# Patient Record
Sex: FEMALE | Race: WHITE | NOT HISPANIC OR LATINO
[De-identification: names, ages, dates, MRNs, and addresses within clinical notes are randomized per-mention and may not be internally consistent; named-entity substitution may affect disease eponyms.]

---

## 2019-05-08 ENCOUNTER — RESULT REVIEW (OUTPATIENT)
Age: 34
End: 2019-05-08

## 2020-04-22 ENCOUNTER — RESULT REVIEW (OUTPATIENT)
Age: 35
End: 2020-04-22

## 2020-05-26 ENCOUNTER — APPOINTMENT (OUTPATIENT)
Dept: ANTEPARTUM | Facility: CLINIC | Age: 35
End: 2020-05-26
Payer: COMMERCIAL

## 2020-05-26 ENCOUNTER — ASOB RESULT (OUTPATIENT)
Age: 35
End: 2020-05-26

## 2020-05-26 PROCEDURE — 76801 OB US < 14 WKS SINGLE FETUS: CPT

## 2020-05-26 PROCEDURE — 76813 OB US NUCHAL MEAS 1 GEST: CPT | Mod: 59

## 2020-05-26 PROCEDURE — 36416 COLLJ CAPILLARY BLOOD SPEC: CPT

## 2020-05-28 ENCOUNTER — APPOINTMENT (OUTPATIENT)
Dept: ANTEPARTUM | Facility: CLINIC | Age: 35
End: 2020-05-28

## 2020-07-01 ENCOUNTER — APPOINTMENT (OUTPATIENT)
Dept: PEDIATRIC MEDICAL GENETICS | Facility: CLINIC | Age: 35
End: 2020-07-01
Payer: COMMERCIAL

## 2020-07-01 DIAGNOSIS — Z78.9 OTHER SPECIFIED HEALTH STATUS: ICD-10-CM

## 2020-07-01 DIAGNOSIS — E03.9 HYPOTHYROIDISM, UNSPECIFIED: ICD-10-CM

## 2020-07-01 DIAGNOSIS — O09.522 SUPERVISION OF ELDERLY MULTIGRAVIDA, SECOND TRIMESTER: ICD-10-CM

## 2020-07-01 PROCEDURE — 99443: CPT

## 2020-07-01 PROCEDURE — ZZZZZ: CPT

## 2020-07-21 ENCOUNTER — APPOINTMENT (OUTPATIENT)
Dept: ANTEPARTUM | Facility: CLINIC | Age: 35
End: 2020-07-21
Payer: COMMERCIAL

## 2020-07-21 ENCOUNTER — ASOB RESULT (OUTPATIENT)
Age: 35
End: 2020-07-21

## 2020-07-21 PROCEDURE — 76811 OB US DETAILED SNGL FETUS: CPT

## 2020-11-01 RX ADMIN — Medication 600 MILLIGRAM(S): at 12:30

## 2020-11-09 ENCOUNTER — ASOB RESULT (OUTPATIENT)
Age: 35
End: 2020-11-09

## 2020-11-09 ENCOUNTER — OUTPATIENT (OUTPATIENT)
Dept: OUTPATIENT SERVICES | Facility: HOSPITAL | Age: 35
LOS: 1 days | End: 2020-11-09

## 2020-11-09 ENCOUNTER — APPOINTMENT (OUTPATIENT)
Dept: ANTEPARTUM | Facility: CLINIC | Age: 35
End: 2020-11-09
Payer: COMMERCIAL

## 2020-11-09 PROCEDURE — 99214 OFFICE O/P EST MOD 30 MIN: CPT | Mod: 25

## 2020-11-09 PROCEDURE — 99072 ADDL SUPL MATRL&STAF TM PHE: CPT

## 2020-11-09 PROCEDURE — 76816 OB US FOLLOW-UP PER FETUS: CPT

## 2020-11-09 PROCEDURE — 76818 FETAL BIOPHYS PROFILE W/NST: CPT | Mod: 26

## 2020-11-11 ENCOUNTER — ASOB RESULT (OUTPATIENT)
Age: 35
End: 2020-11-11

## 2020-11-11 ENCOUNTER — APPOINTMENT (OUTPATIENT)
Dept: MATERNAL FETAL MEDICINE | Facility: CLINIC | Age: 35
End: 2020-11-11
Payer: COMMERCIAL

## 2020-11-11 DIAGNOSIS — O99.810 ABNORMAL GLUCOSE COMPLICATING PREGNANCY: ICD-10-CM

## 2020-11-11 PROCEDURE — G0109 DIAB MANAGE TRN IND/GROUP: CPT | Mod: 95

## 2020-11-11 RX ORDER — BLOOD-GLUCOSE METER
KIT MISCELLANEOUS 4 TIMES DAILY
Qty: 2 | Refills: 2 | Status: ACTIVE | COMMUNITY
Start: 2020-11-11 | End: 1900-01-01

## 2020-11-11 RX ORDER — ISOPROPYL ALCOHOL 0.7 ML/ML
SWAB TOPICAL
Qty: 2 | Refills: 2 | Status: ACTIVE | COMMUNITY
Start: 2020-11-11 | End: 1900-01-01

## 2020-11-11 RX ORDER — LANCETS 33 GAUGE
EACH MISCELLANEOUS
Qty: 2 | Refills: 2 | Status: ACTIVE | COMMUNITY
Start: 2020-11-11 | End: 1900-01-01

## 2020-11-11 RX ORDER — BLOOD-GLUCOSE METER
W/DEVICE KIT MISCELLANEOUS
Qty: 1 | Refills: 0 | Status: ACTIVE | COMMUNITY
Start: 2020-11-11 | End: 1900-01-01

## 2020-11-14 ENCOUNTER — TRANSCRIPTION ENCOUNTER (OUTPATIENT)
Age: 35
End: 2020-11-14

## 2020-11-15 ENCOUNTER — INPATIENT (INPATIENT)
Facility: HOSPITAL | Age: 35
LOS: 3 days | Discharge: HOME | End: 2020-11-19
Attending: SPECIALIST | Admitting: SPECIALIST

## 2020-11-15 VITALS — TEMPERATURE: 99 F

## 2020-11-15 DIAGNOSIS — O26.899 OTHER SPECIFIED PREGNANCY RELATED CONDITIONS, UNSPECIFIED TRIMESTER: ICD-10-CM

## 2020-11-15 DIAGNOSIS — Z3A.00 WEEKS OF GESTATION OF PREGNANCY NOT SPECIFIED: ICD-10-CM

## 2020-11-15 DIAGNOSIS — Z98.891 HISTORY OF UTERINE SCAR FROM PREVIOUS SURGERY: Chronic | ICD-10-CM

## 2020-11-15 LAB
BASOPHILS # BLD AUTO: 0.06 K/UL — SIGNIFICANT CHANGE UP (ref 0–0.2)
BASOPHILS NFR BLD AUTO: 0.6 % — SIGNIFICANT CHANGE UP (ref 0–2)
BLD GP AB SCN SERPL QL: NEGATIVE — SIGNIFICANT CHANGE UP
EOSINOPHIL # BLD AUTO: 0.08 K/UL — SIGNIFICANT CHANGE UP (ref 0–0.5)
EOSINOPHIL NFR BLD AUTO: 0.8 % — SIGNIFICANT CHANGE UP (ref 0–6)
GLUCOSE BLDC GLUCOMTR-MCNC: 87 MG/DL — SIGNIFICANT CHANGE UP (ref 70–99)
HCT VFR BLD CALC: 37.6 % — SIGNIFICANT CHANGE UP (ref 34.5–45)
HGB BLD-MCNC: 12.3 G/DL — SIGNIFICANT CHANGE UP (ref 11.5–15.5)
IMM GRANULOCYTES NFR BLD AUTO: 1.9 % — HIGH (ref 0–1.5)
LYMPHOCYTES # BLD AUTO: 19.3 % — SIGNIFICANT CHANGE UP (ref 13–44)
LYMPHOCYTES # BLD AUTO: 2.01 K/UL — SIGNIFICANT CHANGE UP (ref 1–3.3)
MCHC RBC-ENTMCNC: 29.6 PG — SIGNIFICANT CHANGE UP (ref 27–34)
MCHC RBC-ENTMCNC: 32.7 % — SIGNIFICANT CHANGE UP (ref 32–36)
MCV RBC AUTO: 90.4 FL — SIGNIFICANT CHANGE UP (ref 80–100)
MONOCYTES # BLD AUTO: 0.98 K/UL — HIGH (ref 0–0.9)
MONOCYTES NFR BLD AUTO: 9.4 % — SIGNIFICANT CHANGE UP (ref 2–14)
NEUTROPHILS # BLD AUTO: 7.07 K/UL — SIGNIFICANT CHANGE UP (ref 1.8–7.4)
NEUTROPHILS NFR BLD AUTO: 68 % — SIGNIFICANT CHANGE UP (ref 43–77)
NRBC # FLD: 0 K/UL — SIGNIFICANT CHANGE UP (ref 0–0)
PLATELET # BLD AUTO: 218 K/UL — SIGNIFICANT CHANGE UP (ref 150–400)
PMV BLD: 11.4 FL — SIGNIFICANT CHANGE UP (ref 7–13)
RBC # BLD: 4.16 M/UL — SIGNIFICANT CHANGE UP (ref 3.8–5.2)
RBC # FLD: 14.6 % — HIGH (ref 10.3–14.5)
RH IG SCN BLD-IMP: POSITIVE — SIGNIFICANT CHANGE UP
WBC # BLD: 10.4 K/UL — SIGNIFICANT CHANGE UP (ref 3.8–10.5)
WBC # FLD AUTO: 10.4 K/UL — SIGNIFICANT CHANGE UP (ref 3.8–10.5)

## 2020-11-15 RX ORDER — SODIUM CHLORIDE 9 MG/ML
1000 INJECTION, SOLUTION INTRAVENOUS
Refills: 0 | Status: DISCONTINUED | OUTPATIENT
Start: 2020-11-15 | End: 2020-11-15

## 2020-11-15 RX ORDER — FAMOTIDINE 10 MG/ML
20 INJECTION INTRAVENOUS ONCE
Refills: 0 | Status: COMPLETED | OUTPATIENT
Start: 2020-11-15 | End: 2020-11-15

## 2020-11-15 RX ORDER — METOCLOPRAMIDE HCL 10 MG
10 TABLET ORAL ONCE
Refills: 0 | Status: COMPLETED | OUTPATIENT
Start: 2020-11-15 | End: 2020-11-15

## 2020-11-15 RX ORDER — SODIUM CHLORIDE 9 MG/ML
1000 INJECTION, SOLUTION INTRAVENOUS ONCE
Refills: 0 | Status: COMPLETED | OUTPATIENT
Start: 2020-11-15 | End: 2020-11-15

## 2020-11-15 RX ORDER — OXYTOCIN 10 UNIT/ML
333.33 VIAL (ML) INJECTION
Qty: 20 | Refills: 0 | Status: DISCONTINUED | OUTPATIENT
Start: 2020-11-15 | End: 2020-11-15

## 2020-11-15 RX ORDER — CITRIC ACID/SODIUM CITRATE 300-500 MG
30 SOLUTION, ORAL ORAL ONCE
Refills: 0 | Status: COMPLETED | OUTPATIENT
Start: 2020-11-15 | End: 2020-11-15

## 2020-11-15 RX ADMIN — SODIUM CHLORIDE 2000 MILLILITER(S): 9 INJECTION, SOLUTION INTRAVENOUS at 21:40

## 2020-11-15 RX ADMIN — Medication 30 MILLILITER(S): at 21:59

## 2020-11-15 RX ADMIN — Medication 10 MILLIGRAM(S): at 21:58

## 2020-11-15 RX ADMIN — FAMOTIDINE 20 MILLIGRAM(S): 10 INJECTION INTRAVENOUS at 21:58

## 2020-11-15 NOTE — OB PROVIDER H&P - PROBLEM SELECTOR PLAN 1
-Admit l&d. Routine labs   -Patient for repeat  STAT  -Fetus: cat 1 tracing  -NPO diet   -GBS negative   -Covid 19 pending

## 2020-11-15 NOTE — OB RN PATIENT PROFILE - PMH
Asked per ed rn to enter in chart.  Will not print Hypothyroid     (normal spontaneous vaginal delivery)   and  FT

## 2020-11-15 NOTE — OB PROVIDER TRIAGE NOTE - NSOBPROVIDERNOTE_OBGYN_ALL_OB_FT
Vital Signs Last 24 Hrs  T(C): 37.0 (15 Nov 2020 21:38), Max: 37.2 (15 Nov 2020 20:10)  T(F): 98.6 (15 Nov 2020 21:38), Max: 99 (15 Nov 2020 20:12)  HR: 115 (15 Nov 2020 21:32) (94 - 115)  BP: 135/69 (15 Nov 2020 21:32) (128/60 - 138/71)  BP(mean): --  RR: 18 (15 Nov 2020 20:12) (18 - 18)  SpO2: --    General: A&O x3  Abdomen: soft, non tender  SVE: 80/-3  EFW 3373gm from sonogram on 2020    NSt reactive with moderate variability, cat 1  toco ctx 2-3 minutes    @ 2100  Presented patient to Dr Noel   36 y/o pt 37 weeks  presents in active labor for repeat   Plan:   -Admit l&d. Routine labs   -Patient for repeat  STAT  -Fetus: cat 1 tracing  -NPO diet   -GBS negative   -Covid 19 pending        huddle @  for unscheduled  with Dr Abdul, Dr Srinivasan, anesthesia, charge RN

## 2020-11-15 NOTE — OB PROVIDER TRIAGE NOTE - NS_FETALMOVEMENT_OBGYN_ALL_OB
Patient is leaving Thursday morning for vacation & needs it refilled by tomorrow.   Present, unchanged

## 2020-11-15 NOTE — PROGRESS NOTE ADULT - SUBJECTIVE AND OBJECTIVE BOX
pt is here c/o ctx q 3-4 min apart and she is 37 wks and she does have aa hx of  and then c/s and she is  for r c/s   pt is stable and nst is category1   ctx q3 min apart   pelvic exam was done in triage was 2-3 cm   a/p    pt is going for r c/s and she was counseled in detailed about the risk of infection, bleeding injury to bowel bladder or any other organ near by the surgery site and she understands and in case of emergency any procedure deemed necessary can be proceeds and she agreed and consent obtained and all q/a     janna levine md

## 2020-11-15 NOTE — OB RN INTRAOPERATIVE NOTE - NS_ELECTROSURGICALUNITBIOMEDNUMBER_OBGYN_ALL_OB_FT
Patient would like to cancel her foot surgery that is scheudled for 6/19 as she is having other medical issues at this time. She will call back to reschedule.   [id number]

## 2020-11-15 NOTE — OB RN TRIAGE NOTE - NS_FETALMOVEMENT_OBGYN_ALL_OB
Present, unchanged Billing Type: Third-Party Bill Bill For Surgical Tray: no Performing Laboratory: [id number]

## 2020-11-15 NOTE — OB PROVIDER H&P - ASSESSMENT
Vital Signs Last 24 Hrs  T(C): 37.0 (15 Nov 2020 21:38), Max: 37.2 (15 Nov 2020 20:10)  T(F): 98.6 (15 Nov 2020 21:38), Max: 99 (15 Nov 2020 20:12)  HR: 115 (15 Nov 2020 21:32) (94 - 115)  BP: 135/69 (15 Nov 2020 21:32) (128/60 - 138/71)  BP(mean): --  RR: 18 (15 Nov 2020 20:12) (18 - 18)  SpO2: --    General: A&O x3  Abdomen: soft, non tender  SVE: 80/-3  EFW 3373gm from sonogram on 2020    NSt reactive with moderate variability, cat 1  toco ctx 2-3 minutes    @ 2100  Presented patient to Dr Noel   36 y/o pt 37 weeks  presents in active labor for repeat   Plan:   -Admit l&d. Routine labs   -Patient for repeat  STAT  -Fetus: cat 1 tracing  -NPO diet   -GBS negative   -Crossmatch 2 units PRBC   -Covid 19 pending        huddle @  for unscheduled  with Dr Abdul, Dr Srinivasan, anesthesia, charge RN

## 2020-11-15 NOTE — OB PROVIDER TRIAGE NOTE - HISTORY OF PRESENT ILLNESS
36 y/o pt 37 weeks  presents to triage with c/o of painful contractions every 5 minutes since 1730. pt reports 9/10 pain with contraction. pt denies any LOF and bleeding. pt denies n/v/d, fever or chills. pt endorses +fetal movement.   Patient with hx of previous  in , pt desires repeat caesarean   Last PO @ 1900   AP complicated by:  -Polyhydramnios   -LGA  -Patient was advised to check fingersticks as of last week due to polyhydramnios and LGA     NKDA  PMH: Hypothyroid   PSH:   Primary    OB:  Primary  failure to progess @6cm 10/19/2016 6#9   2013 FT 7#   2012 FT 7#4  GYN: denies  Social hx: denies   Medications:   PNV  Synthroid 112mcg

## 2020-11-15 NOTE — OB PROVIDER TRIAGE NOTE - NSHPPHYSICALEXAM_GEN_ALL_CORE
Vital Signs Last 24 Hrs  T(C): 37.0 (15 Nov 2020 21:38), Max: 37.2 (15 Nov 2020 20:10)  T(F): 98.6 (15 Nov 2020 21:38), Max: 99 (15 Nov 2020 20:12)  HR: 115 (15 Nov 2020 21:32) (94 - 115)  BP: 135/69 (15 Nov 2020 21:32) (128/60 - 138/71)  BP(mean): --  RR: 18 (15 Nov 2020 20:12) (18 - 18)  SpO2: --    General: A&O x3  Abdomen: soft, non tender  SVE: 2/80/-3  EFW 3373gm from sonogram on 11/09/2020    NSt reactive with moderate variability, cat 1  toco ctx 2-3 minutes

## 2020-11-15 NOTE — OB NEONATOLOGY/PEDIATRICIAN DELIVERY SUMMARY - NSPEDSNEONOTESA_OBGYN_ALL_OB_FT
37 wk GA baby born to a 34 y/o  mother. Maternal hx of GDMA1, hypothyroid on synthroid. PNLs neg, immune, NR. BT O+. GBS - on . No ROM. Baby was born vigorous and spontaneously crying. WDSS. APGARs 9/9. EOS Consents Hep B, refuses circ.

## 2020-11-15 NOTE — OB RN TRIAGE NOTE - PMH
(normal spontaneous vaginal delivery)   and  FT   Hypothyroid     (normal spontaneous vaginal delivery)   and  FT

## 2020-11-16 LAB
BASOPHILS # BLD AUTO: 0.06 K/UL — SIGNIFICANT CHANGE UP (ref 0–0.2)
BASOPHILS NFR BLD AUTO: 0.6 % — SIGNIFICANT CHANGE UP (ref 0–2)
EOSINOPHIL # BLD AUTO: 0.03 K/UL — SIGNIFICANT CHANGE UP (ref 0–0.5)
EOSINOPHIL NFR BLD AUTO: 0.3 % — SIGNIFICANT CHANGE UP (ref 0–6)
HCT VFR BLD CALC: 28.9 % — LOW (ref 34.5–45)
HGB BLD-MCNC: 9.1 G/DL — LOW (ref 11.5–15.5)
IMM GRANULOCYTES NFR BLD AUTO: 0.9 % — SIGNIFICANT CHANGE UP (ref 0–1.5)
LYMPHOCYTES # BLD AUTO: 1.86 K/UL — SIGNIFICANT CHANGE UP (ref 1–3.3)
LYMPHOCYTES # BLD AUTO: 18.2 % — SIGNIFICANT CHANGE UP (ref 13–44)
MCHC RBC-ENTMCNC: 29.1 PG — SIGNIFICANT CHANGE UP (ref 27–34)
MCHC RBC-ENTMCNC: 31.5 % — LOW (ref 32–36)
MCV RBC AUTO: 92.3 FL — SIGNIFICANT CHANGE UP (ref 80–100)
MONOCYTES # BLD AUTO: 1.15 K/UL — HIGH (ref 0–0.9)
MONOCYTES NFR BLD AUTO: 11.3 % — SIGNIFICANT CHANGE UP (ref 2–14)
NEUTROPHILS # BLD AUTO: 7.02 K/UL — SIGNIFICANT CHANGE UP (ref 1.8–7.4)
NEUTROPHILS NFR BLD AUTO: 68.7 % — SIGNIFICANT CHANGE UP (ref 43–77)
NRBC # FLD: 0 K/UL — SIGNIFICANT CHANGE UP (ref 0–0)
PLATELET # BLD AUTO: 168 K/UL — SIGNIFICANT CHANGE UP (ref 150–400)
PMV BLD: 11.8 FL — SIGNIFICANT CHANGE UP (ref 7–13)
RBC # BLD: 3.13 M/UL — LOW (ref 3.8–5.2)
RBC # FLD: 14.8 % — HIGH (ref 10.3–14.5)
SARS-COV-2 RNA SPEC QL NAA+PROBE: SIGNIFICANT CHANGE UP
T PALLIDUM AB TITR SER: NEGATIVE — SIGNIFICANT CHANGE UP
WBC # BLD: 10.21 K/UL — SIGNIFICANT CHANGE UP (ref 3.8–10.5)
WBC # FLD AUTO: 10.21 K/UL — SIGNIFICANT CHANGE UP (ref 3.8–10.5)

## 2020-11-16 RX ORDER — SIMETHICONE 80 MG/1
80 TABLET, CHEWABLE ORAL EVERY 4 HOURS
Refills: 0 | Status: DISCONTINUED | OUTPATIENT
Start: 2020-11-15 | End: 2020-11-19

## 2020-11-16 RX ORDER — FERROUS SULFATE 325(65) MG
325 TABLET ORAL DAILY
Refills: 0 | Status: DISCONTINUED | OUTPATIENT
Start: 2020-11-16 | End: 2020-11-17

## 2020-11-16 RX ORDER — TETANUS TOXOID, REDUCED DIPHTHERIA TOXOID AND ACELLULAR PERTUSSIS VACCINE, ADSORBED 5; 2.5; 8; 8; 2.5 [IU]/.5ML; [IU]/.5ML; UG/.5ML; UG/.5ML; UG/.5ML
0.5 SUSPENSION INTRAMUSCULAR ONCE
Refills: 0 | Status: DISCONTINUED | OUTPATIENT
Start: 2020-11-16 | End: 2020-11-19

## 2020-11-16 RX ORDER — KETOROLAC TROMETHAMINE 30 MG/ML
30 SYRINGE (ML) INJECTION EVERY 6 HOURS
Refills: 0 | Status: DISCONTINUED | OUTPATIENT
Start: 2020-11-15 | End: 2020-11-16

## 2020-11-16 RX ORDER — OXYCODONE HYDROCHLORIDE 5 MG/1
5 TABLET ORAL
Refills: 0 | Status: DISCONTINUED | OUTPATIENT
Start: 2020-11-15 | End: 2020-11-19

## 2020-11-16 RX ORDER — IBUPROFEN 200 MG
600 TABLET ORAL EVERY 6 HOURS
Refills: 0 | Status: COMPLETED | OUTPATIENT
Start: 2020-11-15 | End: 2021-10-14

## 2020-11-16 RX ORDER — LEVOTHYROXINE SODIUM 125 MCG
112 TABLET ORAL DAILY
Refills: 0 | Status: DISCONTINUED | OUTPATIENT
Start: 2020-11-16 | End: 2020-11-19

## 2020-11-16 RX ORDER — KETOROLAC TROMETHAMINE 30 MG/ML
30 SYRINGE (ML) INJECTION EVERY 6 HOURS
Refills: 0 | Status: DISCONTINUED | OUTPATIENT
Start: 2020-11-16 | End: 2020-11-17

## 2020-11-16 RX ORDER — DIPHENHYDRAMINE HCL 50 MG
25 CAPSULE ORAL EVERY 6 HOURS
Refills: 0 | Status: DISCONTINUED | OUTPATIENT
Start: 2020-11-15 | End: 2020-11-19

## 2020-11-16 RX ORDER — HEPARIN SODIUM 5000 [USP'U]/ML
5000 INJECTION INTRAVENOUS; SUBCUTANEOUS EVERY 12 HOURS
Refills: 0 | Status: DISCONTINUED | OUTPATIENT
Start: 2020-11-15 | End: 2020-11-19

## 2020-11-16 RX ORDER — LANOLIN
1 OINTMENT (GRAM) TOPICAL EVERY 6 HOURS
Refills: 0 | Status: DISCONTINUED | OUTPATIENT
Start: 2020-11-15 | End: 2020-11-19

## 2020-11-16 RX ORDER — SODIUM CHLORIDE 9 MG/ML
1000 INJECTION, SOLUTION INTRAVENOUS
Refills: 0 | Status: DISCONTINUED | OUTPATIENT
Start: 2020-11-15 | End: 2020-11-17

## 2020-11-16 RX ORDER — MAGNESIUM HYDROXIDE 400 MG/1
30 TABLET, CHEWABLE ORAL
Refills: 0 | Status: DISCONTINUED | OUTPATIENT
Start: 2020-11-15 | End: 2020-11-19

## 2020-11-16 RX ORDER — ACETAMINOPHEN 500 MG
975 TABLET ORAL
Refills: 0 | Status: DISCONTINUED | OUTPATIENT
Start: 2020-11-15 | End: 2020-11-19

## 2020-11-16 RX ORDER — OXYCODONE HYDROCHLORIDE 5 MG/1
5 TABLET ORAL ONCE
Refills: 0 | Status: DISCONTINUED | OUTPATIENT
Start: 2020-11-15 | End: 2020-11-19

## 2020-11-16 RX ORDER — OXYTOCIN 10 UNIT/ML
333.33 VIAL (ML) INJECTION
Qty: 20 | Refills: 0 | Status: COMPLETED | OUTPATIENT
Start: 2020-11-15 | End: 2020-11-15

## 2020-11-16 RX ADMIN — Medication 975 MILLIGRAM(S): at 20:53

## 2020-11-16 RX ADMIN — Medication 30 MILLIGRAM(S): at 12:15

## 2020-11-16 RX ADMIN — HEPARIN SODIUM 5000 UNIT(S): 5000 INJECTION INTRAVENOUS; SUBCUTANEOUS at 18:02

## 2020-11-16 RX ADMIN — Medication 325 MILLIGRAM(S): at 12:56

## 2020-11-16 RX ADMIN — Medication 30 MILLIGRAM(S): at 12:55

## 2020-11-16 RX ADMIN — Medication 30 MILLIGRAM(S): at 18:02

## 2020-11-16 RX ADMIN — HEPARIN SODIUM 5000 UNIT(S): 5000 INJECTION INTRAVENOUS; SUBCUTANEOUS at 06:06

## 2020-11-16 RX ADMIN — Medication 30 MILLIGRAM(S): at 18:29

## 2020-11-16 RX ADMIN — Medication 1000 MILLIUNIT(S)/MIN: at 00:01

## 2020-11-16 RX ADMIN — Medication 112 MICROGRAM(S): at 06:07

## 2020-11-16 RX ADMIN — SIMETHICONE 80 MILLIGRAM(S): 80 TABLET, CHEWABLE ORAL at 20:53

## 2020-11-16 RX ADMIN — Medication 975 MILLIGRAM(S): at 21:53

## 2020-11-16 RX ADMIN — Medication 1 TABLET(S): at 12:56

## 2020-11-16 RX ADMIN — Medication 30 MILLIGRAM(S): at 01:10

## 2020-11-16 RX ADMIN — Medication 30 MILLIGRAM(S): at 06:06

## 2020-11-16 RX ADMIN — Medication 30 MILLIGRAM(S): at 06:20

## 2020-11-16 RX ADMIN — Medication 975 MILLIGRAM(S): at 10:00

## 2020-11-16 RX ADMIN — Medication 975 MILLIGRAM(S): at 10:30

## 2020-11-16 NOTE — PROGRESS NOTE ADULT - SUBJECTIVE AND OBJECTIVE BOX
Pain Service Follow-up  Postop Day  1    S/P  C- Section    T(C): 36.8 (11-16-20 @ 05:40), Max: 37.2 (11-15-20 @ 20:10)  HR: 105 (11-16-20 @ 05:40) (94 - 115)  BP: 125/61 (11-16-20 @ 05:40) (92/53 - 138/71)  RR: 18 (11-16-20 @ 05:40) (16 - 23)  SpO2: 100% (11-16-20 @ 05:40) (99% - 100%)  Wt(kg): --      THERAPY:  Spinal Morphine     Sedation Score:	  [X] Alert	      [  ] Drowsy       [  ] Arousable	[  ] Asleep         [  ] Unresponsive    Side Effects:	  [X] None	      [  ] Nausea       [  ] Pruritus        [  ] Weakness   [  ] Numbness        ASSESSMENT/ PLAN   [ X ] Discontinue         [  ] Continue    [ X ] Documentation and Verification of current medications       Satisfactory Post Anesthetic Course

## 2020-11-16 NOTE — OB RN DELIVERY SUMMARY - NS_DELIVERYASSIST1_OBGYN_ALL_OB_FT
"Patient Education     [address] [place] [address]  Your healthcare provider may recommend a bland dietÂ if you have an upset stomach. It consists of foods that are mild and easy to digest. It is better to eat small frequent meals rather thanÂ 3 large meals a day. Beverages  OK: Fruit juices, non-caffeinated teas and coffee, non-carbonated parikh  Avoid: Carbonated beverage, caffeinated tea and coffee, all alcoholic beverages  Bread  OK: Refined white, wheat or rye bread, mabel or soda crackers, Vero toast, plain rolls, bagels  Avoid: Whole-grain bread  Cereal  OK: Refined cereals: cooked or ready to eat  Avoid: Whole-grain cereals and granola, or those containing bran, seeds or nuts  Desserts  OK: Peanut butter and all others except those to ""avoid\""  Avoid: Chocolate, cocoa, coconut, popcorn, nuts, seeds, jam, marmalade  Fruits  OK: Canned, cooked, frozen or fresh fruits without seeds or tough skin  Avoid: Olives, skin and seeds of fruit  Meats  OK: All fresh or preserved meat, fish and fowl  Avoid: Any that are prepared with those spices to \""avoid\""  Cheese and eggs  OK: Eggs, cottage cheese, cream cheese, other cheeses  Avoid: All cheeses made with those spices to \""avoid\""  Potatoes and pasta  OK: Potato, rice, macaroni, noodles, spaghetti  Avoid: None  Soups  OK: All soups without heavy seasoning  Avoid: Soups made with those spices to \""avoid\""  Vegetables  OK: Canned, cooked, fresh or frozen mildly flavored vegetables without seeds, skins or coarse fiber  Avoid: Vegetables prepared with those spices to \""avoid\""; skin and seeds of vegetables and those with coarse fiber  Spices  OK: Salt, lemon and lime juice, vinegar, all extracts, rose, cinnamon, thyme, mace, allspice, paprika  Avoid: Chili powder, cloves, pepper, seed spices, garlic, gravy pickles, highly seasoned salad dressings  Â© 7467-[address], [place], [address] [place] [address]. All rights reserved.  This information is not intended as a " substitute for professional medical care. Always follow your healthcare professional's instructions. JASKARAN Oh MD

## 2020-11-16 NOTE — OB PROVIDER DELIVERY SUMMARY - NSPROVIDERDELIVERYNOTE_OBGYN_ALL_OB_FT
nonscheduled, urgent rLTCS, A1/polyhydramnios  Viable F infant, apgars 9/9, weight 3140g  Hysterotomy closed in 1 layer using vicryl  Kristine placed over hysterotomy   Grossly normal uterus, tubes, and ovaries  Moderate adhesions noted between anterior aspect of uterus, bladder, and peritoneum  Abdomen closed in standard fashion  Pt and infant to recovery in stable condition  Placenta to pathology  EBL: 800  IVF: 2800   UOP: 200    Adomney PGy-2

## 2020-11-16 NOTE — PROGRESS NOTE ADULT - SUBJECTIVE AND OBJECTIVE BOX
Postpartum Note,  Section  She is a  35y woman who is now post-operative day: 1    Subjective:  The patient feels well.  She is ambulating.   She is tolerating regular diet.  She denies nausea and vomiting.  She is voiding.  Her pain is controlled.  She reports normal postpartum bleeding.        Physical exam:    Vital Signs Last 24 Hrs  T(C): 36.7 (2020 10:24), Max: 37.2 (15 Nov 2020 20:10)  T(F): 98.1 (2020 10:24), Max: 99 (15 Nov 2020 20:12)  HR: 105 (2020 05:40) (94 - 115)  BP: 125/61 (2020 05:40) (92/53 - 138/71)  BP(mean): 85 (2020 03:00) (50 - 89)  RR: 18 (2020 10:24) (16 - 23)  SpO2: 100% (2020 10:24) (99% - 100%)    Gen: NAD  Breast: Soft, nontender, not engorged.  Abdomen: Soft, nontender, no distension , firm uterine fundus at umbilicus.  Incision: Clean, dry, and intact with steri strips  Pelvic: Normal lochia noted  Ext: No calf tenderness    LABS:                        9.1    10.21 )-----------( 168      ( 2020 06:59 )             28.9                         12.3   10.40 )-----------( 218      ( 15 Nov 2020 21:00 )             37.6     ABO Interpretation: O (11-15 @ 21:06)  Rh Interpretation: Positive (11-15 @ 21:06)  ABO Interpretation: O (11-15 @ 20:59)  Rh Interpretation: Positive (11-15 @ 20:59)  ABO Interpretation: O (11-15 @ 20:59)  Rh Interpretation: Positive (11-15 @ 20:59)  Antibody Screen: Negative (11-15 @ 20:59)                Allergies    No Known Allergies    Intolerances      MEDICATIONS  (STANDING):  acetaminophen   Tablet .. 975 milliGRAM(s) Oral <User Schedule>  diphtheria/tetanus/pertussis (acellular) Vaccine (ADAcel) 0.5 milliLiter(s) IntraMuscular once  ferrous    sulfate 325 milliGRAM(s) Oral daily  heparin   Injectable 5000 Unit(s) SubCutaneous every 12 hours  ibuprofen  Tablet. 600 milliGRAM(s) Oral every 6 hours  ketorolac   Injectable 30 milliGRAM(s) IV Push every 6 hours  lactated ringers. 1000 milliLiter(s) (75 mL/Hr) IV Continuous <Continuous>  levothyroxine 112 MICROGram(s) Oral daily  prenatal multivitamin 1 Tablet(s) Oral daily    MEDICATIONS  (PRN):  diphenhydrAMINE 25 milliGRAM(s) Oral every 6 hours PRN Itching  lanolin Ointment 1 Application(s) Topical every 6 hours PRN Sore Nipples  magnesium hydroxide Suspension 30 milliLiter(s) Oral two times a day PRN Constipation  oxyCODONE    IR 5 milliGRAM(s) Oral every 3 hours PRN Moderate to Severe Pain (4-10)  oxyCODONE    IR 5 milliGRAM(s) Oral once PRN Moderate to Severe Pain (4-10)  simethicone 80 milliGRAM(s) Chew every 4 hours PRN Gas        Assessment and Plan  POD #1 s/p  section  Doing well.  Encourage ambulation.

## 2020-11-16 NOTE — OB RN DELIVERY SUMMARY - NS_SEPSISRSKCALC_OBGYN_ALL_OB_FT
EOS calculated successfully. EOS Risk Factor: 0.5/1000 live births ([facility] national incidence); GA=37w;Temp=99; ROM=0; GBS='Negative'; Antibiotics='No antibiotics or any antibiotics < 2 hrs prior to birth'

## 2020-11-17 ENCOUNTER — APPOINTMENT (OUTPATIENT)
Dept: ANTEPARTUM | Facility: HOSPITAL | Age: 35
End: 2020-11-17

## 2020-11-17 ENCOUNTER — APPOINTMENT (OUTPATIENT)
Dept: ANTEPARTUM | Facility: CLINIC | Age: 35
End: 2020-11-17

## 2020-11-17 RX ORDER — ASCORBIC ACID 60 MG
500 TABLET,CHEWABLE ORAL DAILY
Refills: 0 | Status: DISCONTINUED | OUTPATIENT
Start: 2020-11-17 | End: 2020-11-19

## 2020-11-17 RX ORDER — IBUPROFEN 200 MG
600 TABLET ORAL EVERY 6 HOURS
Refills: 0 | Status: DISCONTINUED | OUTPATIENT
Start: 2020-11-17 | End: 2020-11-19

## 2020-11-17 RX ORDER — FERROUS SULFATE 325(65) MG
325 TABLET ORAL THREE TIMES A DAY
Refills: 0 | Status: DISCONTINUED | OUTPATIENT
Start: 2020-11-17 | End: 2020-11-19

## 2020-11-17 RX ADMIN — SIMETHICONE 80 MILLIGRAM(S): 80 TABLET, CHEWABLE ORAL at 12:02

## 2020-11-17 RX ADMIN — Medication 975 MILLIGRAM(S): at 11:59

## 2020-11-17 RX ADMIN — Medication 600 MILLIGRAM(S): at 23:40

## 2020-11-17 RX ADMIN — Medication 975 MILLIGRAM(S): at 06:00

## 2020-11-17 RX ADMIN — Medication 600 MILLIGRAM(S): at 18:30

## 2020-11-17 RX ADMIN — SIMETHICONE 80 MILLIGRAM(S): 80 TABLET, CHEWABLE ORAL at 18:08

## 2020-11-17 RX ADMIN — Medication 975 MILLIGRAM(S): at 18:00

## 2020-11-17 RX ADMIN — HEPARIN SODIUM 5000 UNIT(S): 5000 INJECTION INTRAVENOUS; SUBCUTANEOUS at 05:10

## 2020-11-17 RX ADMIN — Medication 325 MILLIGRAM(S): at 06:09

## 2020-11-17 RX ADMIN — SIMETHICONE 80 MILLIGRAM(S): 80 TABLET, CHEWABLE ORAL at 23:40

## 2020-11-17 RX ADMIN — Medication 975 MILLIGRAM(S): at 23:40

## 2020-11-17 RX ADMIN — Medication 30 MILLIGRAM(S): at 01:45

## 2020-11-17 RX ADMIN — Medication 500 MILLIGRAM(S): at 11:59

## 2020-11-17 RX ADMIN — Medication 325 MILLIGRAM(S): at 05:12

## 2020-11-17 RX ADMIN — SIMETHICONE 80 MILLIGRAM(S): 80 TABLET, CHEWABLE ORAL at 05:11

## 2020-11-17 RX ADMIN — Medication 1 TABLET(S): at 11:59

## 2020-11-17 RX ADMIN — Medication 112 MICROGRAM(S): at 05:11

## 2020-11-17 RX ADMIN — HEPARIN SODIUM 5000 UNIT(S): 5000 INJECTION INTRAVENOUS; SUBCUTANEOUS at 18:00

## 2020-11-17 RX ADMIN — Medication 600 MILLIGRAM(S): at 06:00

## 2020-11-17 RX ADMIN — Medication 325 MILLIGRAM(S): at 23:39

## 2020-11-17 RX ADMIN — Medication 975 MILLIGRAM(S): at 18:30

## 2020-11-17 RX ADMIN — Medication 600 MILLIGRAM(S): at 05:11

## 2020-11-17 RX ADMIN — Medication 600 MILLIGRAM(S): at 12:30

## 2020-11-17 RX ADMIN — Medication 600 MILLIGRAM(S): at 18:00

## 2020-11-17 RX ADMIN — Medication 30 MILLIGRAM(S): at 01:07

## 2020-11-17 RX ADMIN — Medication 600 MILLIGRAM(S): at 11:59

## 2020-11-17 RX ADMIN — Medication 975 MILLIGRAM(S): at 12:30

## 2020-11-17 RX ADMIN — Medication 975 MILLIGRAM(S): at 05:11

## 2020-11-17 NOTE — PROGRESS NOTE ADULT - ASSESSMENT
PE:  Lung sounds clear bilaterally. Normal heart rhythm.   Breasts: soft, nontender  Nipples: intact  Abdomen: Soft, nondistended and nontender. Bowel sounds present. Fundus firm  Abdominal incision: Clean, dry and intact with dermabond.   Vaginal: Lochia light rubra  Extremities: No edema noted bilaterally and equal to lower extremities, nontender with no erythremic areas noted. Positive pedal pulses. No palpable veins noted  Other relevant physical exam findings: none          Plan  A/P: 35y      Day#2    repeat post-operative  section delivery. EBL 800cc. 37weeks. Stable          Problem#1:  section delivery  Continue routine post-operative and postpartum care  Increase ambulation, analgesia PRN and pain medication protocol of standing ibuprofen and acetaminophen and oxycodone prn  Encouraged to wear abdominal binder for support and to use incentive spirometer  Discussed abdominal incision care.   Discussed breast/nipple care and breastfeeding.  Discussed discharge planning

## 2020-11-17 NOTE — PROGRESS NOTE ADULT - SUBJECTIVE AND OBJECTIVE BOX
Patient assessed at 0737.  Subjective  35y      POD#2    repeat post-operative  section delivery.  37weeks. Medical/Surgical/OB/GYN History of  section (2016), NSVDx2 (, ), hypothyroidism on synthroid.      Patient denies any pain at the time of assessment. Pain being managed well by pain management protocol. Patient denies any headache, blur vision, dizziness and/or weakness/fatigue. Out of bed ambulating passing flatus, negative bowel movement denies urge, voiding without difficulties.   Tolerating a regular diet. Patient formula feeding. Infant in the NICU      Vitals  T(C): 36.4 (20 @ 05:55), Max: 36.9 (20 @ 18:29)  HR: 98 (20 @ 05:55) (98 - 108)  BP: 122/57 (20 @ 05:55) (114/53 - 123/66)  RR: 17 (20 @ 05:55) (16 - 18)  SpO2: 99% (20 @ 05:55) (99% - 100%)  Wt(kg): --                 LABS:               9.1    10.21 )-----------( 168      ( 2020 06:59 )             28.9       Blood Type: O Positive    RPR: Negative    COVID-19 negative          MEDICATIONS  (STANDING):  acetaminophen   Tablet .. 975 milliGRAM(s) Oral <User Schedule>  ascorbic acid 500 milliGRAM(s) Oral daily  diphtheria/tetanus/pertussis (acellular) Vaccine (ADAcel) 0.5 milliLiter(s) IntraMuscular once  ferrous    sulfate 325 milliGRAM(s) Oral three times a day  heparin   Injectable 5000 Unit(s) SubCutaneous every 12 hours  ibuprofen  Tablet. 600 milliGRAM(s) Oral every 6 hours  levothyroxine 112 MICROGram(s) Oral daily  prenatal multivitamin 1 Tablet(s) Oral daily    MEDICATIONS  (PRN):  diphenhydrAMINE 25 milliGRAM(s) Oral every 6 hours PRN Itching  lanolin Ointment 1 Application(s) Topical every 6 hours PRN Sore Nipples  magnesium hydroxide Suspension 30 milliLiter(s) Oral two times a day PRN Constipation  oxyCODONE    IR 5 milliGRAM(s) Oral every 3 hours PRN Moderate to Severe Pain (4-10)  oxyCODONE    IR 5 milliGRAM(s) Oral once PRN Moderate to Severe Pain (4-10)  simethicone 80 milliGRAM(s) Chew every 4 hours PRN Gas

## 2020-11-18 ENCOUNTER — APPOINTMENT (OUTPATIENT)
Dept: MATERNAL FETAL MEDICINE | Facility: CLINIC | Age: 35
End: 2020-11-18

## 2020-11-18 LAB
SARS-COV-2 IGG SERPL IA-ACNC: 0.5 RATIO — SIGNIFICANT CHANGE UP
SARS-COV-2 IGG SERPL QL IA: NEGATIVE — SIGNIFICANT CHANGE UP
SARS-COV-2 IGG SERPL QL IA: NEGATIVE — SIGNIFICANT CHANGE UP
SARS-COV-2 IGM SERPL IA-ACNC: 0.34 RATIO — SIGNIFICANT CHANGE UP

## 2020-11-18 RX ADMIN — Medication 600 MILLIGRAM(S): at 12:00

## 2020-11-18 RX ADMIN — HEPARIN SODIUM 5000 UNIT(S): 5000 INJECTION INTRAVENOUS; SUBCUTANEOUS at 05:28

## 2020-11-18 RX ADMIN — Medication 975 MILLIGRAM(S): at 06:10

## 2020-11-18 RX ADMIN — Medication 500 MILLIGRAM(S): at 12:32

## 2020-11-18 RX ADMIN — Medication 600 MILLIGRAM(S): at 18:24

## 2020-11-18 RX ADMIN — Medication 325 MILLIGRAM(S): at 22:39

## 2020-11-18 RX ADMIN — Medication 325 MILLIGRAM(S): at 05:28

## 2020-11-18 RX ADMIN — Medication 600 MILLIGRAM(S): at 23:37

## 2020-11-18 RX ADMIN — Medication 975 MILLIGRAM(S): at 18:00

## 2020-11-18 RX ADMIN — Medication 975 MILLIGRAM(S): at 12:30

## 2020-11-18 RX ADMIN — Medication 600 MILLIGRAM(S): at 06:10

## 2020-11-18 RX ADMIN — HEPARIN SODIUM 5000 UNIT(S): 5000 INJECTION INTRAVENOUS; SUBCUTANEOUS at 18:00

## 2020-11-18 RX ADMIN — Medication 600 MILLIGRAM(S): at 18:00

## 2020-11-18 RX ADMIN — Medication 112 MICROGRAM(S): at 05:28

## 2020-11-18 RX ADMIN — Medication 975 MILLIGRAM(S): at 00:30

## 2020-11-18 RX ADMIN — Medication 975 MILLIGRAM(S): at 18:24

## 2020-11-18 RX ADMIN — SIMETHICONE 80 MILLIGRAM(S): 80 TABLET, CHEWABLE ORAL at 18:00

## 2020-11-18 RX ADMIN — Medication 325 MILLIGRAM(S): at 12:32

## 2020-11-18 RX ADMIN — Medication 600 MILLIGRAM(S): at 05:28

## 2020-11-18 RX ADMIN — Medication 975 MILLIGRAM(S): at 12:00

## 2020-11-18 RX ADMIN — Medication 975 MILLIGRAM(S): at 05:28

## 2020-11-18 RX ADMIN — Medication 1 TABLET(S): at 12:33

## 2020-11-18 RX ADMIN — Medication 600 MILLIGRAM(S): at 00:30

## 2020-11-18 RX ADMIN — SIMETHICONE 80 MILLIGRAM(S): 80 TABLET, CHEWABLE ORAL at 05:28

## 2020-11-18 RX ADMIN — Medication 975 MILLIGRAM(S): at 23:36

## 2020-11-18 NOTE — PROGRESS NOTE ADULT - SUBJECTIVE AND OBJECTIVE BOX
OB Postpartum Note: Repeat  Delivery, POD#3    S: 36yo  POD#3 s/p repeat LTCS.  PMH: hypothyroidism on synthroid.  OBHx: LGA & Polyhydraminos this pregnancy.  The patient feels well.  Pain is well controlled. She is tolerating a regular diet and passing flatus. She is voiding spontaneously, and ambulating without difficulty. Denies CP/SOB. Denies lightheadedness/dizziness. Denies N/V.    O:  Vitals:  Vital Signs Last 24 Hrs  T(C): 36.8 (2020 05:26), Max: 36.8 (2020 16:14)  T(F): 98.3 (2020 05:26), Max: 98.3 (2020 05:26)  HR: 91 (2020 05:26) (88 - 98)  BP: 129/68 (2020 05:26) (116/61 - 129/68)  BP(mean): --  RR: 17 (2020 05:26) (15 - 17)  SpO2: 97% (2020 05:26) (97% - 100%)    MEDICATIONS  (STANDING):  acetaminophen   Tablet .. 975 milliGRAM(s) Oral <User Schedule>  ascorbic acid 500 milliGRAM(s) Oral daily  diphtheria/tetanus/pertussis (acellular) Vaccine (ADAcel) 0.5 milliLiter(s) IntraMuscular once  ferrous    sulfate 325 milliGRAM(s) Oral three times a day  heparin   Injectable 5000 Unit(s) SubCutaneous every 12 hours  ibuprofen  Tablet. 600 milliGRAM(s) Oral every 6 hours  levothyroxine 112 MICROGram(s) Oral daily  prenatal multivitamin 1 Tablet(s) Oral daily    MEDICATIONS  (PRN):  diphenhydrAMINE 25 milliGRAM(s) Oral every 6 hours PRN Itching  lanolin Ointment 1 Application(s) Topical every 6 hours PRN Sore Nipples  magnesium hydroxide Suspension 30 milliLiter(s) Oral two times a day PRN Constipation  oxyCODONE    IR 5 milliGRAM(s) Oral every 3 hours PRN Moderate to Severe Pain (4-10)  oxyCODONE    IR 5 milliGRAM(s) Oral once PRN Moderate to Severe Pain (4-10)  simethicone 80 milliGRAM(s) Chew every 4 hours PRN Gas      LABS:  Blood type: O Positive  Rubella IgG: RPR: Negative                          9.1<L>   10.21 >-----------< 168    ( -16 @ 06:59 )             28.9<L>                        12.3   10.40 >-----------< 218    ( -15 @ 21:00 )             37.6      Physical exam:  Gen: NAD  Abdomen: Soft, nontender, no distension , firm uterine fundus   Incision: Clean, dry, and intact with dermabond  Pelvic: Normal lochia noted  Ext: No calf tenderness    A/P: 36yo  POD#3 s/p repeat LTCS.  Patient is stable and is doing well post-operatively.  - Continue motrin, tylenol, oxycodone PRN for pain control.  - Increase ambulation  - Continue regular diet  - Discharge planning, baby currently in NICU.  Patient would like stay another night if baby is not to be discharged.    Kiana CARPENTER

## 2020-11-19 ENCOUNTER — TRANSCRIPTION ENCOUNTER (OUTPATIENT)
Age: 35
End: 2020-11-19

## 2020-11-19 VITALS
RESPIRATION RATE: 18 BRPM | OXYGEN SATURATION: 100 % | SYSTOLIC BLOOD PRESSURE: 118 MMHG | HEART RATE: 79 BPM | TEMPERATURE: 98 F | DIASTOLIC BLOOD PRESSURE: 70 MMHG

## 2020-11-19 RX ORDER — LEVOTHYROXINE SODIUM 125 MCG
1 TABLET ORAL
Qty: 0 | Refills: 0 | DISCHARGE

## 2020-11-19 RX ORDER — ACETAMINOPHEN 500 MG
3 TABLET ORAL
Qty: 0 | Refills: 0 | DISCHARGE
Start: 2020-11-19

## 2020-11-19 RX ORDER — IBUPROFEN 200 MG
1 TABLET ORAL
Qty: 0 | Refills: 0 | DISCHARGE
Start: 2020-11-19

## 2020-11-19 RX ADMIN — Medication 975 MILLIGRAM(S): at 12:18

## 2020-11-19 RX ADMIN — Medication 600 MILLIGRAM(S): at 00:36

## 2020-11-19 RX ADMIN — Medication 600 MILLIGRAM(S): at 12:18

## 2020-11-19 RX ADMIN — Medication 325 MILLIGRAM(S): at 05:33

## 2020-11-19 RX ADMIN — Medication 600 MILLIGRAM(S): at 05:33

## 2020-11-19 RX ADMIN — Medication 975 MILLIGRAM(S): at 06:30

## 2020-11-19 RX ADMIN — Medication 500 MILLIGRAM(S): at 12:18

## 2020-11-19 RX ADMIN — Medication 325 MILLIGRAM(S): at 12:19

## 2020-11-19 RX ADMIN — Medication 600 MILLIGRAM(S): at 06:30

## 2020-11-19 RX ADMIN — HEPARIN SODIUM 5000 UNIT(S): 5000 INJECTION INTRAVENOUS; SUBCUTANEOUS at 05:34

## 2020-11-19 RX ADMIN — Medication 112 MICROGRAM(S): at 05:33

## 2020-11-19 RX ADMIN — Medication 975 MILLIGRAM(S): at 05:33

## 2020-11-19 RX ADMIN — Medication 975 MILLIGRAM(S): at 00:36

## 2020-11-19 RX ADMIN — Medication 1 TABLET(S): at 12:18

## 2020-11-19 NOTE — DISCHARGE NOTE OB - PATIENT PORTAL LINK FT
You can access the FollowMyHealth Patient Portal offered by [facility] by registering at the following website: [email or web address]. By joining [facility]’s FollowMyHealth portal, you will also be able to view your health information using other applications (apps) compatible with our system.

## 2020-11-19 NOTE — PROGRESS NOTE ADULT - SUBJECTIVE AND OBJECTIVE BOX
SUBJECTIVE:    Pain: Controlled    Complaints: None    MILESTONES:    Alert and Oriented x 3  [ x ]  Out of bed/ ambulating. [ x ]  Flatus:   Positive [ x ]  Negative [  ]  Bowel movement  [  ] Positive [  ] Negative   Voiding [x  ] Due to void [  ]   Dong/Indwelling catheter in place [  ]  Diet: Regular [ x ]  Clears [  ]  NPO [  ]    Infant feeding:  Breast [  ]   Bottle [  ]  Both [ X ]  Feeding related issues and/or concerns:      OBJECTIVE:  T(C): 36.6 (20 @ 06:01), Max: 36.9 (20 @ 14:40)  HR: 79 (20 @ 06:01) (79 - 89)  BP: 118/70 (20 @ 06:01) (99/75 - 118/70)  RR: 18 (20 @ 06:01) (16 - 18)  SpO2: 100% (20 @ 06:01) (99% - 100%)  Wt(kg): --          Blood Type: O Positive    RPR: Negative          MEDICATIONS  (STANDING):  acetaminophen   Tablet .. 975 milliGRAM(s) Oral <User Schedule>  ascorbic acid 500 milliGRAM(s) Oral daily  diphtheria/tetanus/pertussis (acellular) Vaccine (ADAcel) 0.5 milliLiter(s) IntraMuscular once  ferrous    sulfate 325 milliGRAM(s) Oral three times a day  heparin   Injectable 5000 Unit(s) SubCutaneous every 12 hours  ibuprofen  Tablet. 600 milliGRAM(s) Oral every 6 hours  levothyroxine 112 MICROGram(s) Oral daily  prenatal multivitamin 1 Tablet(s) Oral daily    MEDICATIONS  (PRN):  diphenhydrAMINE 25 milliGRAM(s) Oral every 6 hours PRN Itching  lanolin Ointment 1 Application(s) Topical every 6 hours PRN Sore Nipples  magnesium hydroxide Suspension 30 milliLiter(s) Oral two times a day PRN Constipation  oxyCODONE    IR 5 milliGRAM(s) Oral every 3 hours PRN Moderate to Severe Pain (4-10)  oxyCODONE    IR 5 milliGRAM(s) Oral once PRN Moderate to Severe Pain (4-10)  simethicone 80 milliGRAM(s) Chew every 4 hours PRN Gas        ASSESSMENT:    35y     G  4    P  4004       PO Day#  4        Delivery: Primary [  ]    Repeat [ X ]     EBL - 800                                    Indication of procedure: Previous C/S in Labor    Condition: Stable    Past Medical History significant for: HPI:  36 y/o pt 37 weeks  presents to triage with c/o of painful contractions every 5 minutes since 1730. pt reports 9/10 pain with contraction. pt denies any LOF and bleeding. pt denies n/v/d, fever or chills. pt endorses +fetal movement.   Patient with hx of previous  in 2016, pt desires repeat caesarean   Last PO @ 1900   AP complicated by:  -Polyhydramnios   -LGA  -Patient was advised to check fingersticks as of last week due to polyhydramnios and LGA     NKDA  PMH: Hypothyroid   PSH:   Primary    OB:  Primary  failure to progess @6cm 10/19/2016 6#9   2013 FT 7#   2012 FT 7#4  GYN: denies  Social hx: denies   Medications:   PNV  Synthroid 112mcg  (15 Nov 2020 22:10)      Current Issues:    Breasts:  Soft [x  ]   Engorged [  ]  Nipples:  Abdomen: Soft [ x ]   Distended [  ] Nontender [  ]     Bowel sounds :  Present [  ]  Absent [  ]   Fundus:  Firm [x  ]  Boggy [  ]    Abdominal incision: Clean, Dry and Intact [x  ]  Staples [  ] Steri Strips [  ] Dermabond [ X ] Sutures [  ]    Patient wearing abdominal binder for support.    Vaginal: Lochia:  Heavy [  ]  Moderate [ x ]   Scant [  ]    Extremities: Edema [  ] Negative Edwardo's Sign [  ] Nontender Chilango  [ x ] Positive pedal pulses [  ]    Other relevant physical exam findings:      PLAN:    Plan: Increase ambulation, analgesia PRN and pain medication protocol standing oxycodone, ibuprofen and acetaminophen.    Diet: Regular diet    Continue routine post-operative and postpartum care.  Follow up in the office on  per Dr. Erazo.     Diabetes - For Repeat Glucose Testing in 6 weeks.    Discharge Planning [ x ]    For discharge Today  [  X  ]    Consults:  Social Work [  ]  Lactation [ x ]  Other [         ]

## 2020-11-19 NOTE — DISCHARGE NOTE OB - CARE PROVIDER_API CALL
Leo Erazo  OBSTETRICS AND GYNECOLOGY  [address] [place] [address]  Phone: [phone]  Fax: [phone]  Follow Up Time:

## 2020-11-19 NOTE — DISCHARGE NOTE OB - MEDICATION SUMMARY - MEDICATIONS TO TAKE
I will START or STAY ON the medications listed below when I get home from the hospital:    ibuprofen 600 mg oral tablet  -- 1 tab(s) by mouth every 6 hours, As Needed  -- Indication: For S/P R C/S    acetaminophen 325 mg oral tablet  -- 3 tab(s) by mouth every 6 hours, As Needed  -- Indication: For S/P R C/S

## 2020-11-19 NOTE — DISCHARGE NOTE OB - CARE PLAN
Principal Discharge DX:	H/O  section  Goal:	Recovery  Assessment and plan of treatment:	Regular Diet, Vaginal Rest X 6 weeks

## 2020-11-24 ENCOUNTER — APPOINTMENT (OUTPATIENT)
Dept: ANTEPARTUM | Facility: CLINIC | Age: 35
End: 2020-11-24

## 2020-11-24 ENCOUNTER — APPOINTMENT (OUTPATIENT)
Dept: ANTEPARTUM | Facility: HOSPITAL | Age: 35
End: 2020-11-24

## 2020-12-11 DIAGNOSIS — O09.523 SUPERVISION OF ELDERLY MULTIGRAVIDA, THIRD TRIMESTER: ICD-10-CM

## 2020-12-11 DIAGNOSIS — O36.63X0 MATERNAL CARE FOR EXCESSIVE FETAL GROWTH, THIRD TRIMESTER, NOT APPLICABLE OR UNSPECIFIED: ICD-10-CM

## 2020-12-11 DIAGNOSIS — O40.3XX0 POLYHYDRAMNIOS, THIRD TRIMESTER, NOT APPLICABLE OR UNSPECIFIED: ICD-10-CM

## 2020-12-11 DIAGNOSIS — O28.1 ABNORMAL BIOCHEMICAL FINDING ON ANTENATAL SCREENING OF MOTHER: ICD-10-CM

## 2021-04-21 ENCOUNTER — RESULT REVIEW (OUTPATIENT)
Age: 36
End: 2021-04-21

## 2021-06-09 PROBLEM — E03.9 HYPOTHYROIDISM, UNSPECIFIED: Chronic | Status: ACTIVE | Noted: 2020-11-15

## 2021-06-15 NOTE — OB PROVIDER H&P - NSOBPROC_OBGYN_ALL_OB
Protopic Pregnancy And Lactation Text: This medication is Pregnancy Category C. It is unknown if this medication is excreted in breast milk when applied topically. None Done

## 2021-06-16 ENCOUNTER — APPOINTMENT (OUTPATIENT)
Dept: SURGERY | Facility: CLINIC | Age: 36
End: 2021-06-16

## 2021-06-24 ENCOUNTER — OUTPATIENT (OUTPATIENT)
Dept: OUTPATIENT SERVICES | Facility: HOSPITAL | Age: 36
LOS: 1 days | End: 2021-06-24
Payer: COMMERCIAL

## 2021-06-24 ENCOUNTER — APPOINTMENT (OUTPATIENT)
Dept: CT IMAGING | Facility: CLINIC | Age: 36
End: 2021-06-24
Payer: MEDICAID

## 2021-06-24 ENCOUNTER — RESULT REVIEW (OUTPATIENT)
Age: 36
End: 2021-06-24

## 2021-06-24 DIAGNOSIS — Z98.891 HISTORY OF UTERINE SCAR FROM PREVIOUS SURGERY: Chronic | ICD-10-CM

## 2021-06-24 DIAGNOSIS — Z00.8 ENCOUNTER FOR OTHER GENERAL EXAMINATION: ICD-10-CM

## 2021-06-24 PROCEDURE — 74177 CT ABD & PELVIS W/CONTRAST: CPT | Mod: 26

## 2021-06-24 PROCEDURE — 74177 CT ABD & PELVIS W/CONTRAST: CPT

## 2021-07-12 ENCOUNTER — OUTPATIENT (OUTPATIENT)
Dept: OUTPATIENT SERVICES | Facility: HOSPITAL | Age: 36
LOS: 1 days | End: 2021-07-12

## 2021-07-12 VITALS
OXYGEN SATURATION: 98 % | HEIGHT: 60 IN | WEIGHT: 141.98 LBS | RESPIRATION RATE: 14 BRPM | TEMPERATURE: 98 F | DIASTOLIC BLOOD PRESSURE: 70 MMHG | SYSTOLIC BLOOD PRESSURE: 105 MMHG | HEART RATE: 60 BPM

## 2021-07-12 DIAGNOSIS — E03.9 HYPOTHYROIDISM, UNSPECIFIED: ICD-10-CM

## 2021-07-12 DIAGNOSIS — K43.9 VENTRAL HERNIA WITHOUT OBSTRUCTION OR GANGRENE: ICD-10-CM

## 2021-07-12 DIAGNOSIS — Z98.891 HISTORY OF UTERINE SCAR FROM PREVIOUS SURGERY: Chronic | ICD-10-CM

## 2021-07-12 DIAGNOSIS — Z98.82 BREAST IMPLANT STATUS: Chronic | ICD-10-CM

## 2021-07-12 DIAGNOSIS — Z30.430 ENCOUNTER FOR INSERTION OF INTRAUTERINE CONTRACEPTIVE DEVICE: Chronic | ICD-10-CM

## 2021-07-12 LAB
ANION GAP SERPL CALC-SCNC: 13 MMOL/L — SIGNIFICANT CHANGE UP (ref 7–14)
BUN SERPL-MCNC: 9 MG/DL — SIGNIFICANT CHANGE UP (ref 7–23)
CALCIUM SERPL-MCNC: 9.7 MG/DL — SIGNIFICANT CHANGE UP (ref 8.4–10.5)
CHLORIDE SERPL-SCNC: 102 MMOL/L — SIGNIFICANT CHANGE UP (ref 98–107)
CO2 SERPL-SCNC: 27 MMOL/L — SIGNIFICANT CHANGE UP (ref 22–31)
CREAT SERPL-MCNC: 0.6 MG/DL — SIGNIFICANT CHANGE UP (ref 0.5–1.3)
GLUCOSE SERPL-MCNC: 81 MG/DL — SIGNIFICANT CHANGE UP (ref 70–99)
HCG UR QL: NEGATIVE — SIGNIFICANT CHANGE UP
HCT VFR BLD CALC: 40.9 % — SIGNIFICANT CHANGE UP (ref 34.5–45)
HGB BLD-MCNC: 13.2 G/DL — SIGNIFICANT CHANGE UP (ref 11.5–15.5)
MCHC RBC-ENTMCNC: 30.1 PG — SIGNIFICANT CHANGE UP (ref 27–34)
MCHC RBC-ENTMCNC: 32.3 GM/DL — SIGNIFICANT CHANGE UP (ref 32–36)
MCV RBC AUTO: 93.4 FL — SIGNIFICANT CHANGE UP (ref 80–100)
NRBC # BLD: 0 /100 WBCS — SIGNIFICANT CHANGE UP
NRBC # FLD: 0 K/UL — SIGNIFICANT CHANGE UP
PLATELET # BLD AUTO: 298 K/UL — SIGNIFICANT CHANGE UP (ref 150–400)
POTASSIUM SERPL-MCNC: 3.7 MMOL/L — SIGNIFICANT CHANGE UP (ref 3.5–5.3)
POTASSIUM SERPL-SCNC: 3.7 MMOL/L — SIGNIFICANT CHANGE UP (ref 3.5–5.3)
RBC # BLD: 4.38 M/UL — SIGNIFICANT CHANGE UP (ref 3.8–5.2)
RBC # FLD: 12.2 % — SIGNIFICANT CHANGE UP (ref 10.3–14.5)
SODIUM SERPL-SCNC: 142 MMOL/L — SIGNIFICANT CHANGE UP (ref 135–145)
WBC # BLD: 8.6 K/UL — SIGNIFICANT CHANGE UP (ref 3.8–10.5)
WBC # FLD AUTO: 8.6 K/UL — SIGNIFICANT CHANGE UP (ref 3.8–10.5)

## 2021-07-12 RX ORDER — SODIUM CHLORIDE 9 MG/ML
3 INJECTION INTRAMUSCULAR; INTRAVENOUS; SUBCUTANEOUS EVERY 8 HOURS
Refills: 0 | Status: DISCONTINUED | OUTPATIENT
Start: 2021-07-15 | End: 2021-07-29

## 2021-07-12 RX ORDER — SODIUM CHLORIDE 9 MG/ML
1000 INJECTION, SOLUTION INTRAVENOUS
Refills: 0 | Status: DISCONTINUED | OUTPATIENT
Start: 2021-07-15 | End: 2021-07-15

## 2021-07-12 NOTE — H&P PST ADULT - NSICDXPASTSURGICALHX_GEN_ALL_CORE_FT
PAST SURGICAL HISTORY:  Encounter for IUD insertion     H/O breast augmentation 2017    H/O  section , 2020

## 2021-07-12 NOTE — H&P PST ADULT - HISTORY OF PRESENT ILLNESS
36 year old female with c/o ventral hernia since 11/2020. Pt presents today for presurgical evaluation for .. 36 year old female with c/o ventral hernia since 11/2020. Pt presents today for presurgical evaluation for Laparoscopic Robotic Ventral Hernia Repair Possible Mesh.

## 2021-07-12 NOTE — H&P PST ADULT - NSICDXFAMILYHX_GEN_ALL_CORE_FT
FAMILY HISTORY:  Father  Still living? Unknown  FH: heart disease, Age at diagnosis: Age Unknown    Mother  Still living? Unknown  Family history of diabetes mellitus (DM), Age at diagnosis: Age Unknown

## 2021-07-12 NOTE — H&P PST ADULT - NSANTHBMIRD_ENT_A_CORE
No response to external stimuli.  No spontaneous respirations.  No apical heart rate.  Negative pupillary response to light. No

## 2021-07-12 NOTE — H&P PST ADULT - NSICDXPROBLEM_GEN_ALL_CORE_FT
PROBLEM DIAGNOSES  Problem: Ventral hernia  Assessment and Plan: Pt scheduled for surgery on 7/15/2021.  Pre-op instructions provided. Pt verbalized understanding.   Pepcid provided for GI prophylaxis.   Pt given detailed verbal and written instructions on chlorhexidine wash. Pt verbalized understanding with teachback.   Pt given urine specimen cup for ucg on admission.       Problem: Hypothyroidism  Assessment and Plan: Pt instructed to take her Synthroid the morning of surgery.

## 2021-07-12 NOTE — H&P PST ADULT - NSANTHOSAYNRD_GEN_A_CORE
After Visit Summary   8/28/2017    Pinky Page    MRN: [id number]           Patient Information     Date Of Birth          1951        Visit Information        Provider Department      8/28/2017 1:00 PM Damon Gr, PhD [facility]        Today's Diagnoses     Major depressive disorder, recurrent episode, mild (H)    -  1    Psychological factors affecting morbid obesity (H)           Follow-ups after your visit        Your next 10 appointments already scheduled     Sep 12, 2017  1:40 PM CDT   [facility] ([facility])    [address],  [address]  [facility] [address]   [phone]            Sep 14, 2017  2:15 PM CDT   Adult Med Follow UP with Joana De Leon MD   Psychiatry Clinic ([facility])    [place] [id number]  [address] [facility] [id number] [phone]            Nov 14, 2017  1:30 PM CST   NEW GENERAL with Michael Lopez MD   Eye Clinic ([facility])    [address] [facility]  [address] [address]  [facility] [address]   [phone]            Jan 08, 2018 11:30 AM CST   (Arrive by 11:15 AM)   Return Visit with Marcos Magdaleno MD   [facility] ([facility])    [address] [facility]  [address] [facility] [place] [facility] [address] [phone]            May 10, 2018 11:30 AM CDT   Return Sleep Patient with Brianda Reddy MD   [facility] [place] Sleep Study ([facility])    [address] [place] [address] [phone]              Who to contact     Please call your clinic at [phone] to:    Ask questions about your health    Make or cancel appointments    Discuss your medicines    Learn about your test results    Speak to your doctor   If you have compliments or concerns about an experience  at your clinic, or if you wish to file a complaint, please contact [facility] Patient Relations at [phone] or email us at [email or web address]         Additional Information About Your Visit        [employer] [employer] Information     Nanoference is an electronic gateway that provides easy, online access to your medical records. With Nanoference, you can request a clinic appointment, read your test results, renew a prescription or communicate with your care team.     To sign up for Nanoference visit the website at [email or web address]   You will be asked to enter the access code listed below, as well as some personal information. Please follow the directions to create your username and password.     Your access code is: [email or web address]  Expires: 2017  7:15 PM     Your access code will  in 90 days. If you need help or a new code, please contact your [facility] or call [phone] for assistance.        Care EveryWhere ID     This is your Care EveryWhere ID. This could be used by other organizations to access your [place] medical records  [id number]        Your Vitals Were     BMI (Body Mass Index)                   41.42 kg/m2            Blood Pressure from Last 3 Encounters:   17 160/78   08/10/17 169/60   17 160/65    Weight from Last 3 Encounters:   17 109.5 kg (241 lb 4.8 oz)   17 109.8 kg (242 lb)   08/10/17 109.3 kg (241 lb)              Today, you had the following     No orders found for display         Today's Medication Changes          These changes are accurate as of: 17  2:04 PM.  If you have any questions, ask your nurse or doctor.               These medicines have changed or have updated prescriptions.        Dose/Directions    metFORMIN 500 MG 24 hr tablet   Commonly known as:  GLUCOPHAGE-XR   This may have changed:    - how much to take  - when to take this   Used for:  Diabetes mellitus, type 2 (H)         Dose:  1000 mg   Take 2 tablets (1,000 mg) by mouth 2 times daily (with meals)   Quantity:  90 tablet   Refills:  3                Primary Care Provider Office Phone # Fax #    Shamika Ileana Kelley [phone]       [facility] [address] [facility] [address]        Equal Access to Services     ABNAYLA NARCISO : Hadii aad ku hadasho Soomaali, waaxda luqadaha, qaybta kaalmada adeegyada, waxay idiin hayaan adeeg khteresa laYunirohitn . So [facility] [phone].    ATENCIÓN: Si habla español, tiene a deluca disposición servicios gratuitos de asistencia lingüística. Link al [phone].    We comply with applicable federal civil rights laws and [place] laws. We do not discriminate on the basis of race, color, national origin, age, disability sex, sexual orientation or gender identity.            Thank you!     Thank you for choosing [facility]  for your care. Our goal is always to provide you with excellent care. Hearing back from our patients is one way we can continue to improve our services. Please take a few minutes to complete the written survey that you may receive in the mail after your visit with us. Thank you!             Your Updated Medication List - Protect others around you: Learn how to safely use, store and throw away your medicines at www.disposemymeds.org.          This list is accurate as of: 8/28/17  2:04 PM.  Always use your most recent med list.                   Brand Name Dispense Instructions for use Diagnosis    acetaminophen 500 MG tablet    TYLENOL    1 Bottle    Take 2 tablets (1,000 mg) by mouth every 6 hours as needed    Sprain of left ankle, unspecified ligament, initial encounter       alum & mag hydroxide-simethicone 200-200-20 MG/5ML Susp suspension    MYLANTA/MAALOX     Take 30 mLs by mouth daily as needed for indigestion        amLODIPine 5 MG tablet    NORVASC    30 tablet    Take 2 tablets (10 mg) by mouth daily    Essential hypertension  with goal blood pressure less than 130/85       artificial saliva Aers spray      Take 1 spray by mouth 3 times daily as needed for dry mouth        ARTIFICIAL TEARS OP      Apply 1 drop to eye 4 times daily.        aspirin 81 MG tablet     100    Take 1 tablet by mouth daily. ENTERIC COATED.        atorvastatin 40 MG tablet    LIPITOR    90 tablet    Take 1 tablet (40 mg) by mouth daily    Hyperlipidemia LDL goal <100       blood glucose monitoring test strip    no brand specified    100 each    Use to test blood sugar 3 times daily or as directed.    Type 2 diabetes mellitus with microalbuminuria, with long-term current use of insulin (H)       calcium polycarbophil 625 MG tablet    FIBERCON     Take 2 tablets by mouth daily        calcium-vitamin D 250-125 MG-UNIT Tabs      Take 1 tablet by mouth 2 times daily. Calcium 250 mg/Vit D 125 IU        CLARITIN 10 MG tablet   Generic drug:  loratadine     30    1 TAB PO QD (Once per day) as needed for ALLERGY SYMPTOMS        clotrimazole 1 % cream    LOTRIMIN    50 g    Apply topically 2 times daily as needed    Dermatophytosis       eucerin cream      Apply  topically as needed. Apply to thigh PRN dry skin        exenatide 10 MCG/0.04ML injection    BYETTA    1 Syringe    Inject 10 mcg Subcutaneous 2 times daily (before meals)    Type 2 diabetes mellitus with microalbuminuria, with long-term current use of insulin (H)       FLUoxetine 40 MG capsule    PROzac    30 capsule    Take 1 capsule (40 mg) by mouth daily    Schizoaffective disorder, depressive type (H)       fluticasone 50 MCG/ACT spray    FLONASE    16 g    Spray 1 spray into both nostrils daily    Seasonal allergic rhinitis       furosemide 20 MG tablet    LASIX    60 tablet    Take 1 tablet (20 mg) by mouth 2 times daily    Lower leg edema       GEL-SASHA DENTINBLOC DT      Apply  to affected area. GEL. Apply to 2nd molar on bottom right daily at bedtime.        GLUCAGON EMERGENCY 1 MG kit   Generic drug:   glucagon      Inject  as directed. 1 mL as needed for signs of hypoglycemia. May repeat as needed in 15 minutes.        HYDROcodone-acetaminophen 5-325 MG per tablet    NORCO     Take 1 tablet by mouth every 6 hours as needed 1-2 tabs        Incontinence Brief Large Misc     60 Units    2 Units 2 times daily    Urge incontinence, Nocturnal enuresis       insulin glargine 100 UNIT/ML injection    LANTUS    8 mL    Inject 24 Units Subcutaneous At Bedtime    Type 2 diabetes mellitus with microalbuminuria, with long-term current use of insulin (H)       LACRI-LUBE OP      Apply  to eye. Place 0.5 inches into both eyes at bedtime        LACTAID 3000 UNIT tablet   Generic drug:  lactase      Take 4 tabs daily with meals.        levothyroxine 175 MCG tablet    SYNTHROID/LEVOTHROID    30 tablet    Take 1 tablet (175 mcg) by mouth daily Give on empty stomach    Other specified hypothyroidism       LORazepam 1 MG tablet    ATIVAN    30 tablet    Take 1 tablet (1 mg) by mouth At Bedtime .  May take additional 1mg daily PRN for agitation.    Generalized anxiety disorder       losartan 100 MG tablet    COZAAR    90 tablet    Take 1 tablet by mouth daily.    Hypertension goal BP (blood pressure) < 130/80, Diabetes mellitus type 2, insulin dependent (H), CKD (chronic kidney disease) stage 2, GFR 60-89 ml/min       metFORMIN 500 MG 24 hr tablet    GLUCOPHAGE-XR    90 tablet    Take 2 tablets (1,000 mg) by mouth 2 times daily (with meals)    Diabetes mellitus, type 2 (H)       MILK OF MAGNESIA PO      Take  by mouth. Take 30 mL as needed for constipation.        NEURONTIN PO      Take 900 mg by mouth 3 times daily.        nystatin 644560 UNIT/GM Powd    MYCOSTATIN    60 g    Apply topically 3 times daily as needed    Candidiasis of skin       polyethylene glycol powder    MIRALAX/GLYCOLAX     Take 1 capful by mouth 2 times daily. 17 GM PO BID        PREVIDENT 5000 PLUS 1.1 % Crea   Generic drug:  Sodium Fluoride      Apply  to  affected area every evening.        saline 0.9 % Soln      Spray 2 sprays in nostril as needed.        SENNA S 8.6-50 MG per tablet   Generic drug:  senna-docusate      Take 2 tablets by mouth At Bedtime.        solifenacin 10 MG tablet    VESICARE    30 tablet    Take 1 tablet (10 mg) by mouth daily    Urge incontinence       ziprasidone 40 MG capsule    GEODON    60 capsule    Take 1 capsule (40 mg) by mouth 2 times daily (with meals)    Schizoaffective disorder, depressive type (H)          No. RAVEN screening performed.  STOP BANG Legend: 0-2 = LOW Risk; 3-4 = INTERMEDIATE Risk; 5-8 = HIGH Risk

## 2021-07-14 ENCOUNTER — TRANSCRIPTION ENCOUNTER (OUTPATIENT)
Age: 36
End: 2021-07-14

## 2021-07-15 ENCOUNTER — OUTPATIENT (OUTPATIENT)
Dept: OUTPATIENT SERVICES | Facility: HOSPITAL | Age: 36
LOS: 1 days | Discharge: HOME | End: 2021-07-15

## 2021-07-15 VITALS
SYSTOLIC BLOOD PRESSURE: 112 MMHG | RESPIRATION RATE: 18 BRPM | OXYGEN SATURATION: 98 % | DIASTOLIC BLOOD PRESSURE: 60 MMHG | HEART RATE: 93 BPM

## 2021-07-15 VITALS
SYSTOLIC BLOOD PRESSURE: 102 MMHG | OXYGEN SATURATION: 100 % | TEMPERATURE: 98 F | DIASTOLIC BLOOD PRESSURE: 52 MMHG | HEART RATE: 65 BPM | RESPIRATION RATE: 16 BRPM

## 2021-07-15 DIAGNOSIS — K43.9 VENTRAL HERNIA WITHOUT OBSTRUCTION OR GANGRENE: ICD-10-CM

## 2021-07-15 DIAGNOSIS — Z30.430 ENCOUNTER FOR INSERTION OF INTRAUTERINE CONTRACEPTIVE DEVICE: Chronic | ICD-10-CM

## 2021-07-15 DIAGNOSIS — Z98.82 BREAST IMPLANT STATUS: Chronic | ICD-10-CM

## 2021-07-15 DIAGNOSIS — Z98.891 HISTORY OF UTERINE SCAR FROM PREVIOUS SURGERY: Chronic | ICD-10-CM

## 2021-07-15 LAB — HCG UR QL: NEGATIVE — SIGNIFICANT CHANGE UP

## 2021-07-15 RX ORDER — FENTANYL CITRATE 50 UG/ML
25 INJECTION INTRAVENOUS
Refills: 0 | Status: DISCONTINUED | OUTPATIENT
Start: 2021-07-15 | End: 2021-07-15

## 2021-07-15 RX ORDER — OXYCODONE HYDROCHLORIDE 5 MG/1
1 TABLET ORAL
Qty: 18 | Refills: 0
Start: 2021-07-15 | End: 2021-07-17

## 2021-07-15 RX ORDER — OXYCODONE HYDROCHLORIDE 5 MG/1
5 TABLET ORAL ONCE
Refills: 0 | Status: DISCONTINUED | OUTPATIENT
Start: 2021-07-15 | End: 2021-07-15

## 2021-07-15 RX ORDER — FENTANYL CITRATE 50 UG/ML
50 INJECTION INTRAVENOUS ONCE
Refills: 0 | Status: DISCONTINUED | OUTPATIENT
Start: 2021-07-15 | End: 2021-07-15

## 2021-07-15 RX ORDER — SODIUM CHLORIDE 9 MG/ML
1000 INJECTION, SOLUTION INTRAVENOUS
Refills: 0 | Status: DISCONTINUED | OUTPATIENT
Start: 2021-07-15 | End: 2021-07-29

## 2021-07-15 RX ORDER — ONDANSETRON 8 MG/1
4 TABLET, FILM COATED ORAL ONCE
Refills: 0 | Status: DISCONTINUED | OUTPATIENT
Start: 2021-07-15 | End: 2021-07-29

## 2021-07-15 RX ORDER — OXYCODONE HYDROCHLORIDE 5 MG/1
1 TABLET ORAL
Qty: 18 | Refills: 0
Start: 2021-07-15 | End: 2021-07-18

## 2021-07-15 RX ADMIN — OXYCODONE HYDROCHLORIDE 5 MILLIGRAM(S): 5 TABLET ORAL at 15:15

## 2021-07-15 NOTE — ASU DISCHARGE PLAN (ADULT/PEDIATRIC) - NURSING INSTRUCTIONS
You received IV Tylenol for pain management at 2pm___. Please DO NOT take any Tylenol (Acetaminophen) containing products, such as Vicodin, Percocet, Excedrin, and cold medications for the next 6 hours (until ___8 PM). DO NOT TAKE MORE THAN 3000 MG OF TYLENOL in a 24 hour period.

## 2021-07-15 NOTE — ASU DISCHARGE PLAN (ADULT/PEDIATRIC) - FOLLOW UP APPOINTMENTS
751 may also call Recovery Room (PACU) 24/7 @ [phone]/[facility]

## 2021-07-15 NOTE — BRIEF OPERATIVE NOTE - OPERATION/FINDINGS
Dong inserted in prior to incision.  Large Ventral hernia identified and repaired with mesh in layers using robot assistance.  Hemostasis achieved.  Port sites closed with deep dermals as well as running subq.  Dong removed at end of case.  Dressing with steristrips, opsites.  ABD binder applied at end of case. Dong inserted in prior to incision.  Large Ventral hernia identified and repaired with retrorectus parietex progrip mesh in layers using robot assistance.  Hemostasis achieved.  Port sites closed with deep dermals as well as running subq.  Dong removed at end of case.  Dressing with steristrips, opsites.  ABD binder applied at end of case.

## 2021-07-15 NOTE — ASU DISCHARGE PLAN (ADULT/PEDIATRIC) - CARE PROVIDER_API CALL
Danny Duran)  Surgery  [address] [facility] [address], [address]  [place] [address]  Phone: [phone]  Fax: [phone]  Follow Up Time: 2 weeks

## 2021-07-15 NOTE — ASU DISCHARGE PLAN (ADULT/PEDIATRIC) - ASU DC SPECIAL INSTRUCTIONSFT
PLEASE DO NOT ENGAGE IN ANY HEAVY LIFTING.  DO NOT LIFT ANYTHING HEAVIER THAN A GALLON OF MILK (ABOUT 8 POUNDS)    PLEASE KEEP ABDOMINAL BINDER ON AT ALL TIMES.  WHEN YOU SHOWER YOU MAY TAKE IT OFF BUT PLEASE PUT IT BACK ON WHEN YOU ARE OUT AND DRY.    PLEASE CALL DR. LUJAN'S OFFICE TO SET UP AN APPOINTMENT WITH HIM IN 2 WEEKS.     PLEASE REMOVE THE OUTER STICKER DRESSING ON SATURDAY.  DO NOT REMOVE THE INNER STERISTRIP DRESSING IT WILL FALL OFF ON ITS OWN.    YOU MAY SHOWER STARTING TOMORROW.  ALLOW SOAP AND WATER TO RUN OVER THE WOUND BUT DO NOT SCRUB THE WOUND DIRECTLY AND DO NOT LET THE  HEAD IT IT DIRECTLY.  PAT TO DRY DO NOT RUB THE WOUND.    PLEASE TAKE 3 TYLENOL 325MG AND 3 IBUPROFEN 200MG EVERY 6 HOURS REGARDLESS IF YOU FEEL PAIN OR NOT.  IF YOU ARE STILL IN PAIN PLEASE TAKE OXYCODONE.  IF THIS STILL DOES NOT CONTROL YOUR PAIN PLEASE CALL DR. LUJAN.    YOU MAY RESUME TAKING ALL OF YOUR HOME MEDICATIONS    YOU MAY HAVE A REGULAR DIET

## 2021-07-15 NOTE — ASU PREOP CHECKLIST - VERIFY SURGICAL SITE/SIDE WITH PATIENT
Facility was notified that their INR result was NOT within therapeutic range. Facility was instructed to continue administering Coumadin/Warfarin as follows: Per JANE Schneider    Description          05/09/17: Per Estefanía LARA- continue current dose (10 mg daily).  Recheck INR in 4 weeks on 06/06/17          Next INR is due in 4 weeks on 06/06/17.  Facility was instructed to contact us with any unusual bleeding, bruising, any changes in medications, diet or health status and if there are any other questions or concerns.   Faxed back to facility      done

## 2022-03-15 ENCOUNTER — OUTPATIENT (OUTPATIENT)
Dept: OUTPATIENT SERVICES | Facility: HOSPITAL | Age: 37
LOS: 1 days | End: 2022-03-15
Payer: MEDICAID

## 2022-03-15 VITALS
DIASTOLIC BLOOD PRESSURE: 71 MMHG | WEIGHT: 145.06 LBS | RESPIRATION RATE: 16 BRPM | HEIGHT: 61 IN | TEMPERATURE: 98 F | OXYGEN SATURATION: 98 % | HEART RATE: 62 BPM | SYSTOLIC BLOOD PRESSURE: 109 MMHG

## 2022-03-15 DIAGNOSIS — Z98.890 OTHER SPECIFIED POSTPROCEDURAL STATES: Chronic | ICD-10-CM

## 2022-03-15 DIAGNOSIS — K80.20 CALCULUS OF GALLBLADDER WITHOUT CHOLECYSTITIS WITHOUT OBSTRUCTION: ICD-10-CM

## 2022-03-15 DIAGNOSIS — Z01.818 ENCOUNTER FOR OTHER PREPROCEDURAL EXAMINATION: ICD-10-CM

## 2022-03-15 DIAGNOSIS — Z30.430 ENCOUNTER FOR INSERTION OF INTRAUTERINE CONTRACEPTIVE DEVICE: Chronic | ICD-10-CM

## 2022-03-15 DIAGNOSIS — Z98.891 HISTORY OF UTERINE SCAR FROM PREVIOUS SURGERY: Chronic | ICD-10-CM

## 2022-03-15 DIAGNOSIS — Z98.82 BREAST IMPLANT STATUS: Chronic | ICD-10-CM

## 2022-03-15 DIAGNOSIS — K81.1 CHRONIC CHOLECYSTITIS: ICD-10-CM

## 2022-03-15 LAB
ALBUMIN SERPL ELPH-MCNC: 4.4 G/DL — SIGNIFICANT CHANGE UP (ref 3.3–5)
ALP SERPL-CCNC: 53 U/L — SIGNIFICANT CHANGE UP (ref 40–120)
ALT FLD-CCNC: 11 U/L — SIGNIFICANT CHANGE UP (ref 10–45)
ANION GAP SERPL CALC-SCNC: 12 MMOL/L — SIGNIFICANT CHANGE UP (ref 5–17)
AST SERPL-CCNC: 10 U/L — SIGNIFICANT CHANGE UP (ref 10–40)
BILIRUB SERPL-MCNC: 0.3 MG/DL — SIGNIFICANT CHANGE UP (ref 0.2–1.2)
BUN SERPL-MCNC: 9 MG/DL — SIGNIFICANT CHANGE UP (ref 7–23)
CALCIUM SERPL-MCNC: 9 MG/DL — SIGNIFICANT CHANGE UP (ref 8.4–10.5)
CHLORIDE SERPL-SCNC: 104 MMOL/L — SIGNIFICANT CHANGE UP (ref 96–108)
CO2 SERPL-SCNC: 25 MMOL/L — SIGNIFICANT CHANGE UP (ref 22–31)
CREAT SERPL-MCNC: 0.62 MG/DL — SIGNIFICANT CHANGE UP (ref 0.5–1.3)
EGFR: 117 ML/MIN/1.73M2 — SIGNIFICANT CHANGE UP
GLUCOSE SERPL-MCNC: 64 MG/DL — LOW (ref 70–99)
HCT VFR BLD CALC: 41.3 % — SIGNIFICANT CHANGE UP (ref 34.5–45)
HGB BLD-MCNC: 13.3 G/DL — SIGNIFICANT CHANGE UP (ref 11.5–15.5)
MCHC RBC-ENTMCNC: 30.3 PG — SIGNIFICANT CHANGE UP (ref 27–34)
MCHC RBC-ENTMCNC: 32.2 GM/DL — SIGNIFICANT CHANGE UP (ref 32–36)
MCV RBC AUTO: 94.1 FL — SIGNIFICANT CHANGE UP (ref 80–100)
NRBC # BLD: 0 /100 WBCS — SIGNIFICANT CHANGE UP (ref 0–0)
PLATELET # BLD AUTO: 286 K/UL — SIGNIFICANT CHANGE UP (ref 150–400)
POTASSIUM SERPL-MCNC: 4.2 MMOL/L — SIGNIFICANT CHANGE UP (ref 3.5–5.3)
POTASSIUM SERPL-SCNC: 4.2 MMOL/L — SIGNIFICANT CHANGE UP (ref 3.5–5.3)
PROT SERPL-MCNC: 6.9 G/DL — SIGNIFICANT CHANGE UP (ref 6–8.3)
RBC # BLD: 4.39 M/UL — SIGNIFICANT CHANGE UP (ref 3.8–5.2)
RBC # FLD: 12.4 % — SIGNIFICANT CHANGE UP (ref 10.3–14.5)
SODIUM SERPL-SCNC: 141 MMOL/L — SIGNIFICANT CHANGE UP (ref 135–145)
WBC # BLD: 8.7 K/UL — SIGNIFICANT CHANGE UP (ref 3.8–10.5)
WBC # FLD AUTO: 8.7 K/UL — SIGNIFICANT CHANGE UP (ref 3.8–10.5)

## 2022-03-15 PROCEDURE — G0463: CPT

## 2022-03-15 PROCEDURE — 80053 COMPREHEN METABOLIC PANEL: CPT

## 2022-03-15 PROCEDURE — 85027 COMPLETE CBC AUTOMATED: CPT

## 2022-03-15 RX ORDER — CEFAZOLIN SODIUM 1 G
2000 VIAL (EA) INJECTION ONCE
Refills: 0 | Status: DISCONTINUED | OUTPATIENT
Start: 2022-03-29 | End: 2022-04-12

## 2022-03-15 RX ORDER — SODIUM CHLORIDE 9 MG/ML
1000 INJECTION, SOLUTION INTRAVENOUS
Refills: 0 | Status: DISCONTINUED | OUTPATIENT
Start: 2022-03-29 | End: 2022-04-12

## 2022-03-15 RX ORDER — SODIUM CHLORIDE 9 MG/ML
3 INJECTION INTRAMUSCULAR; INTRAVENOUS; SUBCUTANEOUS EVERY 8 HOURS
Refills: 0 | Status: DISCONTINUED | OUTPATIENT
Start: 2022-03-29 | End: 2022-04-12

## 2022-03-15 RX ORDER — LIDOCAINE HCL 20 MG/ML
0.2 VIAL (ML) INJECTION ONCE
Refills: 0 | Status: DISCONTINUED | OUTPATIENT
Start: 2022-03-29 | End: 2022-04-12

## 2022-03-15 NOTE — H&P PST ADULT - NSICDXPASTMEDICALHX_GEN_ALL_CORE_FT
PAST MEDICAL HISTORY:  Calculus of gallbladder     Hypothyroid      (normal spontaneous vaginal delivery)  and 2013 FT

## 2022-03-15 NOTE — H&P PST ADULT - HISTORY OF PRESENT ILLNESS
36 year old female with PMH of hypothyroidism, s/p Laparoscopic Robotic Ventral Hernia Repair 7/2021 @ [facility] with gall stones planned for laparoscopic cholecystectomy       ***[facility]   Denies any recent covid infection or exposure  36 year old female with PMH of hypothyroidism, s/p Laparoscopic Robotic Ventral Hernia Repair 7/2021 @ [facility] with intermittent abdominal pain/ gall stones planned for laparoscopic cholecystectomy 3/29/2022      ***Covid test 3/25/2022 @ [facility]   Denies any recent covid infection or exposure

## 2022-03-15 NOTE — H&P PST ADULT - FALL HARM RISK - UNIVERSAL INTERVENTIONS
Bed in lowest position, wheels locked, appropriate side rails in place/Call bell, personal items and telephone in reach/Instruct patient to call for assistance before getting out of bed or chair/Non-slip footwear when patient is out of bed/[place] to call system/Physically safe environment - no spills, clutter or unnecessary equipment/Purposeful Proactive Rounding/Room/bathroom lighting operational, light cord in reach

## 2022-03-15 NOTE — H&P PST ADULT - PROBLEM SELECTOR PLAN 1
laparoscopic [id number]  PST labs send  preprocedure surgical scrub  instructions discussed  Ucg the DOS

## 2022-03-15 NOTE — H&P PST ADULT - NSICDXPASTSURGICALHX_GEN_ALL_CORE_FT
PAST SURGICAL HISTORY:  Encounter for IUD insertion     H/O breast augmentation     H/O  section , 2020    H/O umbilical hernia repair 2021    H/O ventral hernia repair 2021     PAST SURGICAL HISTORY:  Encounter for IUD insertion     H/O breast augmentation     H/O  section , 2020    H/O ventral hernia repair 2021

## 2022-03-15 NOTE — H&P PST ADULT - NEGATIVE GASTROINTESTINAL SYMPTOMS
no nausea/no vomiting/no diarrhea/no constipation/no abdominal pain/no melena no nausea/no vomiting/no diarrhea/no constipation/no melena

## 2022-03-25 ENCOUNTER — OUTPATIENT (OUTPATIENT)
Dept: OUTPATIENT SERVICES | Facility: HOSPITAL | Age: 37
LOS: 1 days | End: 2022-03-25
Payer: SELF-PAY

## 2022-03-25 DIAGNOSIS — Z98.890 OTHER SPECIFIED POSTPROCEDURAL STATES: Chronic | ICD-10-CM

## 2022-03-25 DIAGNOSIS — Z98.891 HISTORY OF UTERINE SCAR FROM PREVIOUS SURGERY: Chronic | ICD-10-CM

## 2022-03-25 DIAGNOSIS — Z98.82 BREAST IMPLANT STATUS: Chronic | ICD-10-CM

## 2022-03-25 DIAGNOSIS — Z11.52 ENCOUNTER FOR SCREENING FOR COVID-19: ICD-10-CM

## 2022-03-25 DIAGNOSIS — Z30.430 ENCOUNTER FOR INSERTION OF INTRAUTERINE CONTRACEPTIVE DEVICE: Chronic | ICD-10-CM

## 2022-03-25 PROBLEM — K80.20 CALCULUS OF GALLBLADDER WITHOUT CHOLECYSTITIS WITHOUT OBSTRUCTION: Chronic | Status: ACTIVE | Noted: 2022-03-15

## 2022-03-25 LAB — SARS-COV-2 RNA SPEC QL NAA+PROBE: SIGNIFICANT CHANGE UP

## 2022-03-25 PROCEDURE — C9803: CPT

## 2022-03-25 PROCEDURE — U0005: CPT

## 2022-03-25 PROCEDURE — U0003: CPT

## 2022-03-28 ENCOUNTER — TRANSCRIPTION ENCOUNTER (OUTPATIENT)
Age: 37
End: 2022-03-28

## 2022-03-29 ENCOUNTER — RESULT REVIEW (OUTPATIENT)
Age: 37
End: 2022-03-29

## 2022-03-29 ENCOUNTER — OUTPATIENT (OUTPATIENT)
Dept: OUTPATIENT SERVICES | Facility: HOSPITAL | Age: 37
LOS: 1 days | End: 2022-03-29
Payer: MEDICAID

## 2022-03-29 ENCOUNTER — TRANSCRIPTION ENCOUNTER (OUTPATIENT)
Age: 37
End: 2022-03-29

## 2022-03-29 VITALS
OXYGEN SATURATION: 97 % | HEART RATE: 73 BPM | RESPIRATION RATE: 12 BRPM | SYSTOLIC BLOOD PRESSURE: 113 MMHG | DIASTOLIC BLOOD PRESSURE: 53 MMHG

## 2022-03-29 VITALS
OXYGEN SATURATION: 96 % | TEMPERATURE: 98 F | RESPIRATION RATE: 14 BRPM | SYSTOLIC BLOOD PRESSURE: 96 MMHG | HEART RATE: 66 BPM | DIASTOLIC BLOOD PRESSURE: 49 MMHG | WEIGHT: 145.06 LBS | HEIGHT: 61 IN

## 2022-03-29 DIAGNOSIS — Z30.430 ENCOUNTER FOR INSERTION OF INTRAUTERINE CONTRACEPTIVE DEVICE: Chronic | ICD-10-CM

## 2022-03-29 DIAGNOSIS — Z98.82 BREAST IMPLANT STATUS: Chronic | ICD-10-CM

## 2022-03-29 DIAGNOSIS — K81.1 CHRONIC CHOLECYSTITIS: ICD-10-CM

## 2022-03-29 DIAGNOSIS — Z98.891 HISTORY OF UTERINE SCAR FROM PREVIOUS SURGERY: Chronic | ICD-10-CM

## 2022-03-29 DIAGNOSIS — Z98.890 OTHER SPECIFIED POSTPROCEDURAL STATES: Chronic | ICD-10-CM

## 2022-03-29 PROCEDURE — 88304 TISSUE EXAM BY PATHOLOGIST: CPT | Mod: 26

## 2022-03-29 PROCEDURE — 88304 TISSUE EXAM BY PATHOLOGIST: CPT

## 2022-03-29 PROCEDURE — C1889: CPT

## 2022-03-29 PROCEDURE — 47562 LAPAROSCOPIC CHOLECYSTECTOMY: CPT

## 2022-03-29 PROCEDURE — C9399: CPT

## 2022-03-29 DEVICE — LIGATING CLIPS WECK HEMOLOK POLYMER MEDIUM-LARGE (GREEN) 6: Type: IMPLANTABLE DEVICE | Status: FUNCTIONAL

## 2022-03-29 DEVICE — SURGICEL POWDER 3 GRAMS: Type: IMPLANTABLE DEVICE | Status: FUNCTIONAL

## 2022-03-29 RX ORDER — APREPITANT 80 MG/1
40 CAPSULE ORAL ONCE
Refills: 0 | Status: COMPLETED | OUTPATIENT
Start: 2022-03-29 | End: 2022-03-29

## 2022-03-29 RX ORDER — OXYCODONE HYDROCHLORIDE 5 MG/1
5 TABLET ORAL ONCE
Refills: 0 | Status: DISCONTINUED | OUTPATIENT
Start: 2022-03-29 | End: 2022-03-29

## 2022-03-29 RX ORDER — OXYCODONE HYDROCHLORIDE 5 MG/1
1 TABLET ORAL
Qty: 12 | Refills: 0
Start: 2022-03-29 | End: 2022-03-31

## 2022-03-29 RX ORDER — FENTANYL CITRATE 50 UG/ML
25 INJECTION INTRAVENOUS
Refills: 0 | Status: DISCONTINUED | OUTPATIENT
Start: 2022-03-29 | End: 2022-03-29

## 2022-03-29 RX ORDER — ONDANSETRON 8 MG/1
4 TABLET, FILM COATED ORAL ONCE
Refills: 0 | Status: COMPLETED | OUTPATIENT
Start: 2022-03-29 | End: 2022-03-29

## 2022-03-29 RX ORDER — FOLIC ACID 0.8 MG
1 TABLET ORAL
Qty: 0 | Refills: 0 | DISCHARGE

## 2022-03-29 RX ORDER — LEVOTHYROXINE SODIUM 125 MCG
1 TABLET ORAL
Qty: 0 | Refills: 0 | DISCHARGE

## 2022-03-29 RX ORDER — CHLORHEXIDINE GLUCONATE 213 G/1000ML
1 SOLUTION TOPICAL ONCE
Refills: 0 | Status: COMPLETED | OUTPATIENT
Start: 2022-03-29 | End: 2022-03-29

## 2022-03-29 RX ADMIN — FENTANYL CITRATE 25 MICROGRAM(S): 50 INJECTION INTRAVENOUS at 13:25

## 2022-03-29 RX ADMIN — ONDANSETRON 4 MILLIGRAM(S): 8 TABLET, FILM COATED ORAL at 13:25

## 2022-03-29 RX ADMIN — OXYCODONE HYDROCHLORIDE 5 MILLIGRAM(S): 5 TABLET ORAL at 14:00

## 2022-03-29 RX ADMIN — APREPITANT 40 MILLIGRAM(S): 80 CAPSULE ORAL at 09:18

## 2022-03-29 RX ADMIN — FENTANYL CITRATE 25 MICROGRAM(S): 50 INJECTION INTRAVENOUS at 14:00

## 2022-03-29 RX ADMIN — OXYCODONE HYDROCHLORIDE 5 MILLIGRAM(S): 5 TABLET ORAL at 13:25

## 2022-03-29 RX ADMIN — FENTANYL CITRATE 25 MICROGRAM(S): 50 INJECTION INTRAVENOUS at 13:30

## 2022-03-29 RX ADMIN — FENTANYL CITRATE 25 MICROGRAM(S): 50 INJECTION INTRAVENOUS at 13:45

## 2022-03-29 RX ADMIN — FENTANYL CITRATE 25 MICROGRAM(S): 50 INJECTION INTRAVENOUS at 14:15

## 2022-03-29 RX ADMIN — FENTANYL CITRATE 25 MICROGRAM(S): 50 INJECTION INTRAVENOUS at 14:10

## 2022-03-29 RX ADMIN — SODIUM CHLORIDE 3 MILLILITER(S): 9 INJECTION INTRAMUSCULAR; INTRAVENOUS; SUBCUTANEOUS at 14:00

## 2022-03-29 RX ADMIN — CHLORHEXIDINE GLUCONATE 1 APPLICATION(S): 213 SOLUTION TOPICAL at 09:19

## 2022-03-29 NOTE — ASU DISCHARGE PLAN (ADULT/PEDIATRIC) - NS MD DC FALL RISK RISK
For information on Fall & Injury Prevention, visit: [email or web address] OR  [email or web address] OR  https://www.cdc.gov/steadi/patient.html

## 2022-03-29 NOTE — ASU DISCHARGE PLAN (ADULT/PEDIATRIC) - ASU DC SPECIAL INSTRUCTIONSFT
- Drink plenty of fluids and rest as needed.  - Leave steri strips in place  - Ice for swelling  - No lifting >20 lbs for 2 weeks  - Please call the office at [phone] to schedule a post-op appointment for 7-10 days - Drink plenty of fluids and rest as needed.  ********************************************************************************************   - Leave steri strips in place  ********************************************************************************************   - Ice for swelling  ********************************************************************************************   - No lifting >20 lbs for 2 weeks  ********************************************************************************************   - Please call the office at [phone] to schedule a post-op appointment for 7-10 days  ********************************************************************************************   You may take TYLENOL (acetaminophen) after _______5:15______ PM if needed for pain. DO NOT take any additional products containing TYLENOL or ACETAMINOPHEN, such as VICODIN, PERCOCET, NORCO, EXCEDRIN, and any over-the-counter cold medications until this time. DO NOT CONSUME MORE THAN 0864-5454 MG of TYLENOL (acetaminophen) in a 24-hour period.   ********************************************************************************************   You may take over-the-counter anti-inflammatories such as ibuprofen, motrin, or advil) after _______7:05______ PM if needed for pain.  ********************************************************************************************

## 2022-03-29 NOTE — ASU DISCHARGE PLAN (ADULT/PEDIATRIC) - CARE PROVIDER_API CALL
Danny Duran)  Surgery  [address] [facility] [address]  [place] [address]  Phone: [phone]  Fax: [phone]  Follow Up Time: 2 weeks

## 2022-03-29 NOTE — ASU PATIENT PROFILE, ADULT - NSICDXPASTSURGICALHX_GEN_ALL_CORE_FT
PAST SURGICAL HISTORY:  Encounter for IUD insertion     H/O breast augmentation     H/O  section , 2020    H/O ventral hernia repair 2021

## 2022-04-04 LAB — SURGICAL PATHOLOGY STUDY: SIGNIFICANT CHANGE UP

## 2022-05-02 ENCOUNTER — RESULT REVIEW (OUTPATIENT)
Age: 37
End: 2022-05-02

## 2022-10-18 ENCOUNTER — NON-APPOINTMENT (OUTPATIENT)
Age: 37
End: 2022-10-18

## 2022-10-31 NOTE — ASU PREOP CHECKLIST - HEIGHT IN CM
154.94 Detail Level: Zone Continue Regimen: Ketoconazole 2% cream BID PRN flare Render In Strict Bullet Format?: No Initiate Treatment: Metronidazole 0.75% cream QD

## 2023-06-30 ENCOUNTER — APPOINTMENT (OUTPATIENT)
Dept: MAMMOGRAPHY | Facility: IMAGING CENTER | Age: 38
End: 2023-06-30
Payer: MEDICAID

## 2023-06-30 ENCOUNTER — APPOINTMENT (OUTPATIENT)
Dept: ULTRASOUND IMAGING | Facility: IMAGING CENTER | Age: 38
End: 2023-06-30
Payer: MEDICAID

## 2023-06-30 ENCOUNTER — OUTPATIENT (OUTPATIENT)
Dept: OUTPATIENT SERVICES | Facility: HOSPITAL | Age: 38
LOS: 1 days | End: 2023-06-30
Payer: MEDICAID

## 2023-06-30 DIAGNOSIS — Z98.890 OTHER SPECIFIED POSTPROCEDURAL STATES: Chronic | ICD-10-CM

## 2023-06-30 DIAGNOSIS — Z98.82 BREAST IMPLANT STATUS: Chronic | ICD-10-CM

## 2023-06-30 DIAGNOSIS — Z98.891 HISTORY OF UTERINE SCAR FROM PREVIOUS SURGERY: Chronic | ICD-10-CM

## 2023-06-30 DIAGNOSIS — Z30.430 ENCOUNTER FOR INSERTION OF INTRAUTERINE CONTRACEPTIVE DEVICE: Chronic | ICD-10-CM

## 2023-06-30 DIAGNOSIS — Z00.8 ENCOUNTER FOR OTHER GENERAL EXAMINATION: ICD-10-CM

## 2023-06-30 PROCEDURE — 76641 ULTRASOUND BREAST COMPLETE: CPT | Mod: 26,50

## 2023-06-30 PROCEDURE — 77063 BREAST TOMOSYNTHESIS BI: CPT | Mod: 26

## 2023-06-30 PROCEDURE — 77067 SCR MAMMO BI INCL CAD: CPT | Mod: 26

## 2023-06-30 PROCEDURE — 77063 BREAST TOMOSYNTHESIS BI: CPT

## 2023-06-30 PROCEDURE — 77067 SCR MAMMO BI INCL CAD: CPT

## 2023-06-30 PROCEDURE — 76641 ULTRASOUND BREAST COMPLETE: CPT

## 2023-08-29 ENCOUNTER — NON-APPOINTMENT (OUTPATIENT)
Age: 38
End: 2023-08-29

## 2023-10-23 ENCOUNTER — OUTPATIENT (OUTPATIENT)
Dept: OUTPATIENT SERVICES | Facility: HOSPITAL | Age: 38
LOS: 1 days | End: 2023-10-23
Payer: MEDICAID

## 2023-10-23 ENCOUNTER — APPOINTMENT (OUTPATIENT)
Dept: MRI IMAGING | Facility: IMAGING CENTER | Age: 38
End: 2023-10-23
Payer: MEDICAID

## 2023-10-23 DIAGNOSIS — Z98.891 HISTORY OF UTERINE SCAR FROM PREVIOUS SURGERY: Chronic | ICD-10-CM

## 2023-10-23 DIAGNOSIS — Z98.82 BREAST IMPLANT STATUS: Chronic | ICD-10-CM

## 2023-10-23 DIAGNOSIS — Z98.890 OTHER SPECIFIED POSTPROCEDURAL STATES: Chronic | ICD-10-CM

## 2023-10-23 DIAGNOSIS — Z00.8 ENCOUNTER FOR OTHER GENERAL EXAMINATION: ICD-10-CM

## 2023-10-23 DIAGNOSIS — Z30.430 ENCOUNTER FOR INSERTION OF INTRAUTERINE CONTRACEPTIVE DEVICE: Chronic | ICD-10-CM

## 2023-10-23 PROCEDURE — C8937: CPT

## 2023-10-23 PROCEDURE — 77049 MRI BREAST C-+ W/CAD BI: CPT | Mod: 26

## 2023-10-23 PROCEDURE — C8908: CPT

## 2023-10-23 PROCEDURE — A9585: CPT

## 2024-05-21 ENCOUNTER — NON-APPOINTMENT (OUTPATIENT)
Age: 39
End: 2024-05-21

## 2025-03-03 ENCOUNTER — NON-APPOINTMENT (OUTPATIENT)
Age: 40
End: 2025-03-03

## 2025-09-10 ENCOUNTER — NON-APPOINTMENT (OUTPATIENT)
Age: 40
End: 2025-09-10

## (undated) DEVICE — DRAPE MAYO STAND 30"

## (undated) DEVICE — SUT POLYSORB 0 36" GU-46

## (undated) DEVICE — TROCAR COVIDIEN VERSAPORT BLADELESS OPTICAL 5MM STANDARD

## (undated) DEVICE — PREP CHLORAPREP HI-LITE ORANGE 26ML

## (undated) DEVICE — APPLICATOR FOR ARISTA XL 38CM

## (undated) DEVICE — POSITIONER FOAM EGG CRATE ULNAR 2PCS (PINK)

## (undated) DEVICE — SOL IRR POUR NS 0.9% 500ML

## (undated) DEVICE — TUBING IRRIGATION DAVOL SYSTEM X STREAM

## (undated) DEVICE — WARMING BLANKET UPPER ADULT

## (undated) DEVICE — GLV 8 PROTEXIS (BLUE)

## (undated) DEVICE — SHEARS COVIDIEN ENDO SHEAR 5MM X 31CM W UNIPOLAR CAUTERY

## (undated) DEVICE — SUT MONOCRYL 4-0 27" PS-2 UNDYED

## (undated) DEVICE — DRSG TELFA 3 X 8

## (undated) DEVICE — DRAPE GENERAL ENDOSCOPY

## (undated) DEVICE — MARKING PEN W RULER

## (undated) DEVICE — MEDICATION LABELS W MARKER

## (undated) DEVICE — DRAPE INSTRUMENT POUCH 6.75" X 11"

## (undated) DEVICE — TROCAR APPLIED MEDICAL KII BALLOON BLUNT TIP 12MM X 100MM

## (undated) DEVICE — TUBE IRR LAPSCP SUCT 5MM 33CM

## (undated) DEVICE — GOWN TRIMAX LG

## (undated) DEVICE — DISSECTOR ENDO PEANUT 5MM

## (undated) DEVICE — TUBING INSUFFLATION LAP FILTER 10FT

## (undated) DEVICE — DRSG STERISTRIPS 0.5 X 4"

## (undated) DEVICE — TROCAR ETHICON ENDOPATH XCEL BLADELESS 5MM X 100MM STABILITY

## (undated) DEVICE — SOL IRR POUR NS 0.9% 1000ML

## (undated) DEVICE — DRAIN RESERVOIR FOR JACKSON PRATT 100CC CARDINAL

## (undated) DEVICE — TROCAR COVIDIEN VERSAONE BLADELESS FIXATION 11MM STANDARD

## (undated) DEVICE — ENDOCATCH 10MM SPECIMEN POUCH

## (undated) DEVICE — D HELP - CLEARVIEW CLEARIFY SYSTEM

## (undated) DEVICE — SOL IRR POUR H2O 250ML

## (undated) DEVICE — DRAPE TOWEL BLUE 17" X 24"

## (undated) DEVICE — APPLICATOR SURGICEL LAP TROCAR POINT 2.5MM X 150MM

## (undated) DEVICE — DRAIN CHANNEL 19FR ROUND FULL FLUTED

## (undated) DEVICE — PACK GENERAL LAPAROSCOPY

## (undated) DEVICE — SUT POLYSORB 3-0 30" V-20 UNDYED

## (undated) DEVICE — VENODYNE/SCD SLEEVE CALF LARGE

## (undated) DEVICE — ELCTR CORD FOOTSWITCH 1PLR LAPSCP 10FT

## (undated) DEVICE — LIGASURE MARYLAND 37CM

## (undated) DEVICE — ELCTR BOVIE TIP BLADE INSULATED 2.75" EDGE

## (undated) DEVICE — GLV 7.5 PROTEXIS (WHITE)

## (undated) DEVICE — DRSG OPSITE 2.5 X 2"